# Patient Record
Sex: FEMALE | Race: WHITE | NOT HISPANIC OR LATINO | Employment: FULL TIME | URBAN - METROPOLITAN AREA
[De-identification: names, ages, dates, MRNs, and addresses within clinical notes are randomized per-mention and may not be internally consistent; named-entity substitution may affect disease eponyms.]

---

## 2021-05-11 ENCOUNTER — IMMUNIZATIONS (OUTPATIENT)
Dept: FAMILY MEDICINE CLINIC | Facility: HOSPITAL | Age: 61
End: 2021-05-11

## 2021-05-11 DIAGNOSIS — Z23 ENCOUNTER FOR IMMUNIZATION: Primary | ICD-10-CM

## 2021-05-11 PROCEDURE — 0001A SARS-COV-2 / COVID-19 MRNA VACCINE (PFIZER-BIONTECH) 30 MCG: CPT

## 2021-05-11 PROCEDURE — 91300 SARS-COV-2 / COVID-19 MRNA VACCINE (PFIZER-BIONTECH) 30 MCG: CPT

## 2021-06-03 ENCOUNTER — IMMUNIZATIONS (OUTPATIENT)
Dept: FAMILY MEDICINE CLINIC | Facility: HOSPITAL | Age: 61
End: 2021-06-03

## 2021-06-03 DIAGNOSIS — Z23 ENCOUNTER FOR IMMUNIZATION: Primary | ICD-10-CM

## 2021-06-03 PROCEDURE — 91300 SARS-COV-2 / COVID-19 MRNA VACCINE (PFIZER-BIONTECH) 30 MCG: CPT

## 2021-06-03 PROCEDURE — 0002A SARS-COV-2 / COVID-19 MRNA VACCINE (PFIZER-BIONTECH) 30 MCG: CPT

## 2021-10-05 ENCOUNTER — APPOINTMENT (EMERGENCY)
Dept: RADIOLOGY | Facility: HOSPITAL | Age: 61
End: 2021-10-05
Payer: COMMERCIAL

## 2021-10-05 ENCOUNTER — APPOINTMENT (EMERGENCY)
Dept: ULTRASOUND IMAGING | Facility: HOSPITAL | Age: 61
End: 2021-10-05
Payer: COMMERCIAL

## 2021-10-05 ENCOUNTER — HOSPITAL ENCOUNTER (EMERGENCY)
Facility: HOSPITAL | Age: 61
Discharge: HOME | End: 2021-10-05
Attending: EMERGENCY MEDICINE | Admitting: EMERGENCY MEDICINE
Payer: COMMERCIAL

## 2021-10-05 VITALS
HEIGHT: 64 IN | OXYGEN SATURATION: 99 % | RESPIRATION RATE: 18 BRPM | DIASTOLIC BLOOD PRESSURE: 63 MMHG | SYSTOLIC BLOOD PRESSURE: 141 MMHG | TEMPERATURE: 98 F | WEIGHT: 147 LBS | HEART RATE: 52 BPM | BODY MASS INDEX: 25.1 KG/M2

## 2021-10-05 DIAGNOSIS — R07.9 CHEST PAIN: Primary | ICD-10-CM

## 2021-10-05 DIAGNOSIS — K29.70 GASTRITIS: ICD-10-CM

## 2021-10-05 LAB
ALBUMIN SERPL BCP-MCNC: 3.6 G/DL (ref 3.5–5)
ALP SERPL-CCNC: 127 U/L (ref 46–116)
ALT SERPL W P-5'-P-CCNC: 19 U/L (ref 12–78)
ANION GAP SERPL CALCULATED.3IONS-SCNC: 10 MMOL/L (ref 4–13)
AST SERPL W P-5'-P-CCNC: 19 U/L (ref 5–45)
BASOPHILS # BLD AUTO: 0.04 THOUSANDS/ΜL (ref 0–0.1)
BASOPHILS NFR BLD AUTO: 1 % (ref 0–1)
BILIRUB SERPL-MCNC: 0.37 MG/DL (ref 0.2–1)
BUN SERPL-MCNC: 19 MG/DL (ref 5–25)
CALCIUM SERPL-MCNC: 8.8 MG/DL (ref 8.3–10.1)
CHLORIDE SERPL-SCNC: 104 MMOL/L (ref 100–108)
CO2 SERPL-SCNC: 26 MMOL/L (ref 21–32)
CREAT SERPL-MCNC: 1.01 MG/DL (ref 0.6–1.3)
EOSINOPHIL # BLD AUTO: 0.07 THOUSAND/ΜL (ref 0–0.61)
EOSINOPHIL NFR BLD AUTO: 1 % (ref 0–6)
ERYTHROCYTE [DISTWIDTH] IN BLOOD BY AUTOMATED COUNT: 14.3 % (ref 11.6–15.1)
GFR SERPL CREATININE-BSD FRML MDRD: 61 ML/MIN/1.73SQ M
GLUCOSE SERPL-MCNC: 97 MG/DL (ref 65–140)
HCT VFR BLD AUTO: 40.4 % (ref 34.8–46.1)
HGB BLD-MCNC: 12.8 G/DL (ref 11.5–15.4)
IMM GRANULOCYTES # BLD AUTO: 0.02 THOUSAND/UL (ref 0–0.2)
IMM GRANULOCYTES NFR BLD AUTO: 0 % (ref 0–2)
LYMPHOCYTES # BLD AUTO: 2.46 THOUSANDS/ΜL (ref 0.6–4.47)
LYMPHOCYTES NFR BLD AUTO: 33 % (ref 14–44)
MCH RBC QN AUTO: 31.8 PG (ref 26.8–34.3)
MCHC RBC AUTO-ENTMCNC: 31.7 G/DL (ref 31.4–37.4)
MCV RBC AUTO: 100 FL (ref 82–98)
MONOCYTES # BLD AUTO: 0.47 THOUSAND/ΜL (ref 0.17–1.22)
MONOCYTES NFR BLD AUTO: 6 % (ref 4–12)
NEUTROPHILS # BLD AUTO: 4.32 THOUSANDS/ΜL (ref 1.85–7.62)
NEUTS SEG NFR BLD AUTO: 59 % (ref 43–75)
NRBC BLD AUTO-RTO: 0 /100 WBCS
PLATELET # BLD AUTO: 261 THOUSANDS/UL (ref 149–390)
PMV BLD AUTO: 10.1 FL (ref 8.9–12.7)
POTASSIUM SERPL-SCNC: 4.2 MMOL/L (ref 3.5–5.3)
PROT SERPL-MCNC: 7 G/DL (ref 6.4–8.2)
RBC # BLD AUTO: 4.03 MILLION/UL (ref 3.81–5.12)
SODIUM SERPL-SCNC: 140 MMOL/L (ref 136–145)
TROPONIN I SERPL-MCNC: <0.02 NG/ML
WBC # BLD AUTO: 7.38 THOUSAND/UL (ref 4.31–10.16)

## 2021-10-05 PROCEDURE — 99284 EMERGENCY DEPT VISIT MOD MDM: CPT | Performed by: EMERGENCY MEDICINE

## 2021-10-05 PROCEDURE — 99285 EMERGENCY DEPT VISIT HI MDM: CPT

## 2021-10-05 PROCEDURE — 93005 ELECTROCARDIOGRAM TRACING: CPT

## 2021-10-05 PROCEDURE — 71045 X-RAY EXAM CHEST 1 VIEW: CPT

## 2021-10-05 PROCEDURE — 84484 ASSAY OF TROPONIN QUANT: CPT | Performed by: EMERGENCY MEDICINE

## 2021-10-05 PROCEDURE — 85025 COMPLETE CBC W/AUTO DIFF WBC: CPT | Performed by: EMERGENCY MEDICINE

## 2021-10-05 PROCEDURE — 36415 COLL VENOUS BLD VENIPUNCTURE: CPT

## 2021-10-05 PROCEDURE — 80053 COMPREHEN METABOLIC PANEL: CPT | Performed by: EMERGENCY MEDICINE

## 2021-10-05 PROCEDURE — 76705 ECHO EXAM OF ABDOMEN: CPT

## 2021-10-05 RX ORDER — LIDOCAINE HYDROCHLORIDE 20 MG/ML
15 SOLUTION OROPHARYNGEAL ONCE
Status: COMPLETED | OUTPATIENT
Start: 2021-10-05 | End: 2021-10-05

## 2021-10-05 RX ORDER — OMEPRAZOLE 20 MG/1
20 CAPSULE, DELAYED RELEASE ORAL DAILY
Qty: 30 CAPSULE | Refills: 0 | Status: SHIPPED | OUTPATIENT
Start: 2021-10-05 | End: 2021-11-04

## 2021-10-05 RX ORDER — MAGNESIUM HYDROXIDE/ALUMINUM HYDROXICE/SIMETHICONE 120; 1200; 1200 MG/30ML; MG/30ML; MG/30ML
30 SUSPENSION ORAL ONCE
Status: COMPLETED | OUTPATIENT
Start: 2021-10-05 | End: 2021-10-05

## 2021-10-05 RX ORDER — SUCRALFATE 1 G/1
1 TABLET ORAL 4 TIMES DAILY
Qty: 120 TABLET | Refills: 0 | Status: SHIPPED | OUTPATIENT
Start: 2021-10-05 | End: 2021-11-04

## 2021-10-05 RX ADMIN — ALUMINA, MAGNESIA, AND SIMETHICONE ORAL SUSPENSION REGULAR STRENGTH 30 ML: 1200; 1200; 120 SUSPENSION ORAL at 12:46

## 2021-10-05 RX ADMIN — LIDOCAINE HYDROCHLORIDE 15 ML: 20 SOLUTION ORAL; TOPICAL at 12:46

## 2021-10-08 LAB
ATRIAL RATE: 58 BPM
P AXIS: 38 DEGREES
PR INTERVAL: 158 MS
QRS AXIS: 42 DEGREES
QRSD INTERVAL: 72 MS
QT INTERVAL: 412 MS
QTC INTERVAL: 398 MS
T WAVE AXIS: 35 DEGREES
VENTRICULAR RATE: 56 BPM

## 2021-10-08 PROCEDURE — 93010 ELECTROCARDIOGRAM REPORT: CPT | Performed by: INTERNAL MEDICINE

## 2023-11-17 ENCOUNTER — PREP FOR PROCEDURE (OUTPATIENT)
Dept: OBGYN CLINIC | Facility: OTHER | Age: 63
End: 2023-11-17

## 2023-11-17 DIAGNOSIS — M19.012 PRIMARY OSTEOARTHRITIS OF LEFT SHOULDER: Primary | ICD-10-CM

## 2023-11-30 ENCOUNTER — ANESTHESIA EVENT (OUTPATIENT)
Dept: PERIOP | Facility: HOSPITAL | Age: 63
End: 2023-11-30
Payer: COMMERCIAL

## 2023-11-30 RX ORDER — FOLIC ACID 1 MG/1
TABLET ORAL DAILY
COMMUNITY

## 2023-11-30 RX ORDER — OXYBUTYNIN CHLORIDE 10 MG/1
10 TABLET, EXTENDED RELEASE ORAL AS NEEDED
COMMUNITY
Start: 2023-10-30 | End: 2024-10-29

## 2023-11-30 RX ORDER — CONJUGATED ESTROGENS 0.62 MG/G
CREAM VAGINAL
COMMUNITY
Start: 2023-10-30 | End: 2024-10-29

## 2023-11-30 RX ORDER — CYCLOSPORINE 0.5 MG/ML
1 EMULSION OPHTHALMIC 2 TIMES DAILY
COMMUNITY
Start: 2023-09-22

## 2023-11-30 RX ORDER — IBUPROFEN 200 MG
200 TABLET ORAL EVERY 6 HOURS PRN
COMMUNITY
End: 2023-12-21

## 2023-11-30 RX ORDER — ETANERCEPT 50 MG/ML
50 SOLUTION SUBCUTANEOUS
COMMUNITY
Start: 2023-10-27

## 2023-11-30 NOTE — PRE-PROCEDURE INSTRUCTIONS
Pre-Surgery Instructions:   Medication Instructions    Cholecalciferol (Vitamin D-3) 125 MCG (5000 UT) TABS Stop taking 7 days prior to surgery.    cycloSPORINE (RESTASIS) 0.05 % ophthalmic emulsion Take day of surgery.    Enbrel 50 MG/ML SOSY Instructions provided by MD    folic acid (FOLVITE) 1 mg tablet Hold day of surgery.    ibuprofen (MOTRIN) 200 mg tablet Stop taking 7 days prior to surgery.    methotrexate 2.5 mg tablet Instructions provided by MD    ONPIEROULINUMTOXINA IJ Takes every 3 mos-due 2/2024    oxybutynin (DITROPAN-XL) 10 MG 24 hr tablet Uses PRN- DO NOT take day of surgery    Premarin vaginal cream Hold day of surgery.   Medication instructions for day surgery reviewed. Please use only a sip of water to take your instructed medications. Avoid all over the counter vitamins, supplements and NSAIDS for one week prior to surgery per anesthesia guidelines. Tylenol is ok to take as needed.     You will receive a call one business day prior to surgery with an arrival time and hospital directions. If your surgery is scheduled on a Monday, the hospital will be calling you on the Friday prior to your surgery. If you have not heard from anyone by 8pm, please call the hospital supervisor through the hospital  at [phone]. (Adiel [phone]).    Do not eat or drink anything after midnight the night before your surgery, including candy, mints, lifesavers, or chewing gum. Do not drink alcohol 24hrs before your surgery. Try not to smoke at least 24hrs before your surgery.       Follow the pre surgery showering instructions as listed in the “My Surgical Experience Booklet” or otherwise provided by your surgeon's office. Do not use a blade to shave the surgical area 1 week before surgery. It is okay to use a clean electric clippers up to 24 hours before surgery. Do not apply any lotions, creams, including makeup, cologne, deodorant, or perfumes after showering on the day of your surgery. Do not use  dry shampoo, hair spray, hair gel, or any type of hair products.     No contact lenses, eye make-up, or artificial eyelashes. Remove nail polish, including gel polish, and any artificial, gel, or acrylic nails if possible. Remove all jewelry including rings and body piercing jewelry.     Wear causal clothing that is easy to take on and off. Consider your type of surgery.    Keep any valuables, jewelry, piercings at home. Please bring any specially ordered equipment (sling, braces) if indicated.    Arrange for a responsible person to drive you to and from the hospital on the day of your surgery. Visitor Guidelines discussed.     Call the surgeon's office with any new illnesses, exposures, or additional questions prior to surgery.    Please reference your “My Surgical Experience Booklet” for additional information to prepare for your upcoming surgery.

## 2023-12-01 ENCOUNTER — APPOINTMENT (OUTPATIENT)
Dept: LAB | Facility: CLINIC | Age: 63
End: 2023-12-01
Payer: COMMERCIAL

## 2023-12-08 ENCOUNTER — APPOINTMENT (OUTPATIENT)
Dept: LAB | Facility: CLINIC | Age: 63
End: 2023-12-08
Payer: COMMERCIAL

## 2023-12-08 DIAGNOSIS — M19.012 PRIMARY OSTEOARTHRITIS OF LEFT SHOULDER: ICD-10-CM

## 2023-12-08 LAB
ABO GROUP BLD: NORMAL
BLD GP AB SCN SERPL QL: NEGATIVE
RH BLD: POSITIVE
SPECIMEN EXPIRATION DATE: NORMAL

## 2023-12-08 PROCEDURE — 86900 BLOOD TYPING SEROLOGIC ABO: CPT

## 2023-12-08 PROCEDURE — 86850 RBC ANTIBODY SCREEN: CPT

## 2023-12-08 PROCEDURE — 36415 COLL VENOUS BLD VENIPUNCTURE: CPT

## 2023-12-08 PROCEDURE — 86901 BLOOD TYPING SEROLOGIC RH(D): CPT

## 2023-12-19 RX ORDER — SENNOSIDES 8.6 MG
1 TABLET ORAL DAILY
Status: CANCELLED | OUTPATIENT
Start: 2023-12-20

## 2023-12-19 RX ORDER — CEFAZOLIN SODIUM 1 G/50ML
1000 SOLUTION INTRAVENOUS EVERY 8 HOURS
Status: CANCELLED | OUTPATIENT
Start: 2023-12-19 | End: 2023-12-20

## 2023-12-19 RX ORDER — ASPIRIN 81 MG/1
81 TABLET, CHEWABLE ORAL DAILY
Status: CANCELLED | OUTPATIENT
Start: 2023-12-20 | End: 2024-01-10

## 2023-12-19 RX ORDER — ONDANSETRON 2 MG/ML
4 INJECTION INTRAMUSCULAR; INTRAVENOUS EVERY 6 HOURS PRN
Status: CANCELLED | OUTPATIENT
Start: 2023-12-19

## 2023-12-19 RX ORDER — HYDROMORPHONE HCL/PF 1 MG/ML
0.5 SYRINGE (ML) INJECTION EVERY 2 HOUR PRN
Status: CANCELLED | OUTPATIENT
Start: 2023-12-19 | End: 2023-12-21

## 2023-12-20 ENCOUNTER — APPOINTMENT (OUTPATIENT)
Dept: RADIOLOGY | Facility: HOSPITAL | Age: 63
End: 2023-12-20
Payer: COMMERCIAL

## 2023-12-20 ENCOUNTER — ANESTHESIA (OUTPATIENT)
Dept: PERIOP | Facility: HOSPITAL | Age: 63
End: 2023-12-20
Payer: COMMERCIAL

## 2023-12-20 ENCOUNTER — HOSPITAL ENCOUNTER (OUTPATIENT)
Facility: HOSPITAL | Age: 63
Setting detail: OUTPATIENT SURGERY
Discharge: HOME | End: 2023-12-21
Attending: ORTHOPAEDIC SURGERY | Admitting: ORTHOPAEDIC SURGERY
Payer: COMMERCIAL

## 2023-12-20 DIAGNOSIS — M12.812 ROTATOR CUFF TEAR ARTHROPATHY OF LEFT SHOULDER: Primary | ICD-10-CM

## 2023-12-20 DIAGNOSIS — M75.102 ROTATOR CUFF TEAR ARTHROPATHY OF LEFT SHOULDER: Primary | ICD-10-CM

## 2023-12-20 PROBLEM — R33.9 URINARY RETENTION: Status: ACTIVE | Noted: 2021-01-16

## 2023-12-20 PROBLEM — M19.012 PRIMARY OSTEOARTHRITIS OF LEFT SHOULDER: Status: ACTIVE | Noted: 2023-12-20

## 2023-12-20 LAB
ABO GROUP BLD: NORMAL
RH BLD: POSITIVE

## 2023-12-20 PROCEDURE — C1776 JOINT DEVICE (IMPLANTABLE): HCPCS | Performed by: ORTHOPAEDIC SURGERY

## 2023-12-20 PROCEDURE — 99244 OFF/OP CNSLTJ NEW/EST MOD 40: CPT | Performed by: INTERNAL MEDICINE

## 2023-12-20 PROCEDURE — 73020 X-RAY EXAM OF SHOULDER: CPT

## 2023-12-20 PROCEDURE — C1713 ANCHOR/SCREW BN/BN,TIS/BN: HCPCS | Performed by: ORTHOPAEDIC SURGERY

## 2023-12-20 PROCEDURE — C9290 INJ, BUPIVACAINE LIPOSOME: HCPCS | Performed by: ANESTHESIOLOGY

## 2023-12-20 DEVICE — 5.5X32MM PERIPHERAL SCREW, LOCKING
Type: IMPLANTABLE DEVICE | Site: SHOULDER | Status: FUNCTIONAL
Brand: ARTHREX®

## 2023-12-20 DEVICE — 5.5X36MM PERIPHERAL SCREW, LOCKING
Type: IMPLANTABLE DEVICE | Site: SHOULDER | Status: FUNCTIONAL
Brand: ARTHREX®

## 2023-12-20 DEVICE — UNIVERS REVERS SUTURE CUP, 33 (+2 LEFT)
Type: IMPLANTABLE DEVICE | Site: SHOULDER | Status: FUNCTIONAL
Brand: ARTHREX®

## 2023-12-20 DEVICE — HUMERAL INSERT XS 33+3MM
Type: IMPLANTABLE DEVICE | Site: SHOULDER | Status: FUNCTIONAL
Brand: ARTHREX®

## 2023-12-20 DEVICE — 24MM BASEPLATE, MONOBLOCK POST
Type: IMPLANTABLE DEVICE | Site: SHOULDER | Status: FUNCTIONAL
Brand: ARTHREX®

## 2023-12-20 DEVICE — 33 +4 LAT/24 GLENOSPHERE
Type: IMPLANTABLE DEVICE | Site: SHOULDER | Status: FUNCTIONAL
Brand: ARTHREX®

## 2023-12-20 DEVICE — UNIVERS REVERS HUMERAL STEM, SIZE 6
Type: IMPLANTABLE DEVICE | Site: SHOULDER | Status: FUNCTIONAL
Brand: ARTHREX®

## 2023-12-20 DEVICE — 5.5X16MM PERIPHERAL SCREW, LOCKING
Type: IMPLANTABLE DEVICE | Site: SHOULDER | Status: FUNCTIONAL
Brand: ARTHREX®

## 2023-12-20 RX ORDER — MAGNESIUM HYDROXIDE 1200 MG/15ML
LIQUID ORAL AS NEEDED
Status: DISCONTINUED | OUTPATIENT
Start: 2023-12-20 | End: 2023-12-20 | Stop reason: HOSPADM

## 2023-12-20 RX ORDER — FOLIC ACID 1 MG/1
1 TABLET ORAL DAILY
Status: DISCONTINUED | OUTPATIENT
Start: 2023-12-21 | End: 2023-12-21 | Stop reason: HOSPADM

## 2023-12-20 RX ORDER — MIDAZOLAM HYDROCHLORIDE 2 MG/2ML
INJECTION, SOLUTION INTRAMUSCULAR; INTRAVENOUS
Status: COMPLETED | OUTPATIENT
Start: 2023-12-20 | End: 2023-12-20

## 2023-12-20 RX ORDER — BUPIVACAINE HYDROCHLORIDE 5 MG/ML
INJECTION, SOLUTION EPIDURAL; INTRACAUDAL
Status: COMPLETED | OUTPATIENT
Start: 2023-12-20 | End: 2023-12-20

## 2023-12-20 RX ORDER — HYDROMORPHONE HCL/PF 1 MG/ML
0.5 SYRINGE (ML) INJECTION
Status: DISCONTINUED | OUTPATIENT
Start: 2023-12-20 | End: 2023-12-20 | Stop reason: HOSPADM

## 2023-12-20 RX ORDER — ACETAMINOPHEN 325 MG/1
650 TABLET ORAL EVERY 6 HOURS PRN
Status: DISCONTINUED | OUTPATIENT
Start: 2023-12-20 | End: 2023-12-21 | Stop reason: HOSPADM

## 2023-12-20 RX ORDER — DEXAMETHASONE SODIUM PHOSPHATE 10 MG/ML
INJECTION, SOLUTION INTRAMUSCULAR; INTRAVENOUS AS NEEDED
Status: DISCONTINUED | OUTPATIENT
Start: 2023-12-20 | End: 2023-12-20

## 2023-12-20 RX ORDER — LIDOCAINE HYDROCHLORIDE 20 MG/ML
INJECTION, SOLUTION EPIDURAL; INFILTRATION; INTRACAUDAL; PERINEURAL AS NEEDED
Status: DISCONTINUED | OUTPATIENT
Start: 2023-12-20 | End: 2023-12-20

## 2023-12-20 RX ORDER — ONDANSETRON 2 MG/ML
INJECTION INTRAMUSCULAR; INTRAVENOUS AS NEEDED
Status: DISCONTINUED | OUTPATIENT
Start: 2023-12-20 | End: 2023-12-20

## 2023-12-20 RX ORDER — OXYCODONE HYDROCHLORIDE 5 MG/1
5 TABLET ORAL EVERY 4 HOURS PRN
Status: DISCONTINUED | OUTPATIENT
Start: 2023-12-20 | End: 2023-12-21 | Stop reason: HOSPADM

## 2023-12-20 RX ORDER — FENTANYL CITRATE 50 UG/ML
INJECTION, SOLUTION INTRAMUSCULAR; INTRAVENOUS
Status: COMPLETED | OUTPATIENT
Start: 2023-12-20 | End: 2023-12-20

## 2023-12-20 RX ORDER — LABETALOL HYDROCHLORIDE 5 MG/ML
INJECTION, SOLUTION INTRAVENOUS AS NEEDED
Status: DISCONTINUED | OUTPATIENT
Start: 2023-12-20 | End: 2023-12-20

## 2023-12-20 RX ORDER — FENTANYL CITRATE/PF 50 MCG/ML
25 SYRINGE (ML) INJECTION
Status: DISCONTINUED | OUTPATIENT
Start: 2023-12-20 | End: 2023-12-20 | Stop reason: HOSPADM

## 2023-12-20 RX ORDER — CEFAZOLIN SODIUM 2 G/50ML
2000 SOLUTION INTRAVENOUS ONCE
Status: COMPLETED | OUTPATIENT
Start: 2023-12-20 | End: 2023-12-20

## 2023-12-20 RX ORDER — SODIUM CHLORIDE, SODIUM LACTATE, POTASSIUM CHLORIDE, CALCIUM CHLORIDE 600; 310; 30; 20 MG/100ML; MG/100ML; MG/100ML; MG/100ML
125 INJECTION, SOLUTION INTRAVENOUS CONTINUOUS
Status: DISCONTINUED | OUTPATIENT
Start: 2023-12-20 | End: 2023-12-21 | Stop reason: HOSPADM

## 2023-12-20 RX ORDER — ONDANSETRON 2 MG/ML
4 INJECTION INTRAMUSCULAR; INTRAVENOUS ONCE AS NEEDED
Status: DISCONTINUED | OUTPATIENT
Start: 2023-12-20 | End: 2023-12-20 | Stop reason: HOSPADM

## 2023-12-20 RX ORDER — ROCURONIUM BROMIDE 10 MG/ML
INJECTION, SOLUTION INTRAVENOUS AS NEEDED
Status: DISCONTINUED | OUTPATIENT
Start: 2023-12-20 | End: 2023-12-20

## 2023-12-20 RX ORDER — MEPERIDINE HYDROCHLORIDE 25 MG/ML
12.5 INJECTION INTRAMUSCULAR; INTRAVENOUS; SUBCUTANEOUS
Status: DISCONTINUED | OUTPATIENT
Start: 2023-12-20 | End: 2023-12-20 | Stop reason: HOSPADM

## 2023-12-20 RX ORDER — PROPOFOL 10 MG/ML
INJECTION, EMULSION INTRAVENOUS AS NEEDED
Status: DISCONTINUED | OUTPATIENT
Start: 2023-12-20 | End: 2023-12-20

## 2023-12-20 RX ADMIN — OXYCODONE HYDROCHLORIDE 5 MG: 5 TABLET ORAL at 21:09

## 2023-12-20 RX ADMIN — CEFAZOLIN SODIUM 2000 MG: 2 SOLUTION INTRAVENOUS at 09:28

## 2023-12-20 RX ADMIN — HYDROMORPHONE HYDROCHLORIDE 0.5 MG: 1 INJECTION, SOLUTION INTRAMUSCULAR; INTRAVENOUS; SUBCUTANEOUS at 12:29

## 2023-12-20 RX ADMIN — HYDROMORPHONE HYDROCHLORIDE 0.5 MG: 1 INJECTION, SOLUTION INTRAMUSCULAR; INTRAVENOUS; SUBCUTANEOUS at 12:17

## 2023-12-20 RX ADMIN — BUPIVACAINE 10 ML: 13.3 INJECTION, SUSPENSION, LIPOSOMAL INFILTRATION at 09:23

## 2023-12-20 RX ADMIN — BUPIVACAINE HYDROCHLORIDE 10 ML: 5 INJECTION, SOLUTION EPIDURAL; INTRACAUDAL; PERINEURAL at 09:23

## 2023-12-20 RX ADMIN — FENTANYL CITRATE 100 MCG: 50 INJECTION, SOLUTION INTRAMUSCULAR; INTRAVENOUS at 09:23

## 2023-12-20 RX ADMIN — SODIUM CHLORIDE, SODIUM LACTATE, POTASSIUM CHLORIDE, AND CALCIUM CHLORIDE: .6; .31; .03; .02 INJECTION, SOLUTION INTRAVENOUS at 10:26

## 2023-12-20 RX ADMIN — OXYCODONE HYDROCHLORIDE 5 MG: 5 TABLET ORAL at 16:44

## 2023-12-20 RX ADMIN — SUGAMMADEX 200 MG: 100 INJECTION, SOLUTION INTRAVENOUS at 11:15

## 2023-12-20 RX ADMIN — FENTANYL CITRATE 25 MCG: 50 INJECTION, SOLUTION INTRAMUSCULAR; INTRAVENOUS at 11:46

## 2023-12-20 RX ADMIN — ROCURONIUM BROMIDE 10 MG: 10 INJECTION, SOLUTION INTRAVENOUS at 10:04

## 2023-12-20 RX ADMIN — MIDAZOLAM 2 MG: 1 INJECTION INTRAMUSCULAR; INTRAVENOUS at 09:23

## 2023-12-20 RX ADMIN — LIDOCAINE HYDROCHLORIDE 60 MG: 20 INJECTION, SOLUTION EPIDURAL; INFILTRATION; INTRACAUDAL at 09:34

## 2023-12-20 RX ADMIN — SODIUM CHLORIDE, SODIUM LACTATE, POTASSIUM CHLORIDE, AND CALCIUM CHLORIDE 125 ML/HR: .6; .31; .03; .02 INJECTION, SOLUTION INTRAVENOUS at 08:44

## 2023-12-20 RX ADMIN — PROPOFOL 150 MG: 10 INJECTION, EMULSION INTRAVENOUS at 09:34

## 2023-12-20 RX ADMIN — ONDANSETRON 4 MG: 2 INJECTION INTRAMUSCULAR; INTRAVENOUS at 10:53

## 2023-12-20 RX ADMIN — LABETALOL HYDROCHLORIDE 5 MG: 5 INJECTION, SOLUTION INTRAVENOUS at 11:13

## 2023-12-20 RX ADMIN — ROCURONIUM BROMIDE 30 MG: 10 INJECTION, SOLUTION INTRAVENOUS at 09:34

## 2023-12-20 RX ADMIN — FENTANYL CITRATE 25 MCG: 50 INJECTION, SOLUTION INTRAMUSCULAR; INTRAVENOUS at 12:10

## 2023-12-20 RX ADMIN — SODIUM CHLORIDE, SODIUM LACTATE, POTASSIUM CHLORIDE, AND CALCIUM CHLORIDE 125 ML/HR: .6; .31; .03; .02 INJECTION, SOLUTION INTRAVENOUS at 14:45

## 2023-12-20 RX ADMIN — DEXAMETHASONE SODIUM PHOSPHATE 7 MG: 10 INJECTION INTRAMUSCULAR; INTRAVENOUS at 10:08

## 2023-12-20 NOTE — ANESTHESIA PROCEDURE NOTES
Peripheral Block    Patient location during procedure: holding area  Start time: 12/20/2023 9:10 AM  Reason for block: at surgeon's request and post-op pain management  Staffing  Performed by: Jake Borja DO  Authorized by: Jake Borja DO    Preanesthetic Checklist  Completed: patient identified, IV checked, site marked, risks and benefits discussed, surgical consent, monitors and equipment checked, pre-op evaluation and timeout performed  Peripheral Block  Patient position: supine  Prep: ChloraPrep  Patient monitoring: continuous pulse oximetry, frequent blood pressure checks and heart rate  Block type: Interscalene  Laterality: left  Injection technique: single-shot  Procedures: ultrasound guided, Ultrasound guidance required for the procedure to increase accuracy and safety of medication placement and decrease risk of complications.  Ultrasound permanent image savedbupivacaine (PF) (MARCAINE) 0.5 % injection 20 mL - Perineural   10 mL - 12/20/2023 9:23:00 AM  bupivacaine liposomal (EXPAREL) 1.3 % injection 20 mL - Perineural   10 mL - 12/20/2023 9:23:00 AM  midazolam (VERSED) injection 0.5 mg - Intravenous   2 mg - 12/20/2023 9:23:00 AM  fentanyl citrate (PF) 100 MCG/2ML 50 mcg - Intravenous   100 mcg - 12/20/2023 9:23:00 AM  Needle  Needle type: Stimuplex   Needle gauge: 22 G  Needle length: 2 in  Needle localization: ultrasound guidance and nerve stimulator  Assessment  Injection assessment: frequent aspiration, injected with ease, negative aspiration, no paresthesia on injection, incremental injection, needle tip visualized at all times, negative for heart rate change and no symptoms of intraneural/intravenous injection  Paresthesia pain: none  Post-procedure:  site cleaned  patient tolerated the procedure well with no immediate complications

## 2023-12-20 NOTE — INTERVAL H&P NOTE
H&P reviewed. After examining the patient I find no changes in the patients condition since the H&P had been written.    Vitals:    12/20/23 0831   BP: 135/67   Pulse: 72   Resp: 16   Temp: 97.5 °F (36.4 °C)   SpO2: 100%

## 2023-12-20 NOTE — CONSULTS
[facility]  Consult  Name: Mary Lopez 63 y.o. female I MRN: [id number]  Unit/Bed#: E2 -01 I Date of Admission: 12/20/2023   Date of Service: 12/20/2023 I Hospital Day: 0    Inpatient consult to Internal Medicine  Consult performed by: Carl Diaz DO  Consult ordered by: Sheron Driscoll PA-C          Assessment/Plan   * Primary osteoarthritis of left shoulder  Assessment & Plan  Degenerative joint disease  Status post reverse total shoulder arthroplasty  DVT prophylaxis, pain control, dispo per primary team    Urinary retention  Assessment & Plan  Utilizes oxybutynin as needed  Urinary retention protocol    Rheumatoid arthritis involving multiple sites with positive rheumatoid factor (HCC)  Assessment & Plan  Utilizes Embrel weekly.  This has been on hold since the 12th and for 2 weeks after procedure.  Takes methotrexate 15 mg total once a week on Saturdays  Utilizes NSAIDs as needed  Follows with rheumatology           VTE Prophylaxis:  Per primary team    Recommendations for Discharge:  Will follow along    Counseling / Coordination of Care Time: I have spent a total time of 55 minutes on 12/20/23 in caring for this patient including Risks and benefits of tx options, Instructions for management, Patient and family education, Impressions, Documenting in the medical record, Reviewing / ordering tests, medicine, procedures  , Obtaining or reviewing history  , and Communicating with other healthcare professionals .      History of Present Illness:    Mary Lopez is a 63 y.o. female With past medical history of of rheumatoid arthritis managed with methotrexate and Enbrel, neurogenic bladder who presents to the hospital for elective left reverse total shoulder arthroplasty.  Patient seen and evaluated at bedside.  She has rheumatoid arthritis which typically involves her hands she uses etanercept which has been on hold perioperatively and utilizes methotrexate which she takes  on Saturdays.  She uses oxybutynin as needed for long trips but not daily.  She uses d-mannose as a preventative for urinary tract infections.  Following the procedure she has a little bit of left-sided headache and some shoulder pain but otherwise is feeling okay.    Review of Systems:    Review of Systems   Constitutional:  Negative for chills and fever.   HENT:  Negative for sore throat and trouble swallowing.    Eyes:  Negative for photophobia and visual disturbance.   Respiratory:  Negative for shortness of breath and wheezing.    Cardiovascular:  Negative for chest pain and palpitations.   Gastrointestinal:  Negative for constipation, diarrhea, nausea and vomiting.   Genitourinary:  Negative for difficulty urinating and dysuria.   Musculoskeletal:  Positive for arthralgias. Negative for myalgias.   Skin:  Negative for rash and wound.   Neurological:  Positive for headaches. Negative for dizziness and light-headedness.       Past Medical and Surgical History:     Past Medical History:   Diagnosis Date    Anemia     Anxiety     Arthritis     Coronary artery disease     Muscle weakness     left side    Myocarditis associated with COVID-19 vaccination      now resolved    Neurogenic bladder     Rheumatoid arthritis (HCC)     Septic arthritis of shoulder, left (HCC)     MSSA-cervical spine, lumbar spine    UTI (urinary tract infection)        Past Surgical History:   Procedure Laterality Date    CERVICAL FUSION      FOOT SURGERY Bilateral     HERNIA REPAIR      as child       Meds/Allergies:    Pertinent medications reviewed    Allergies:   Allergies   Allergen Reactions    Tramadol Other (See Comments)     Hard to wake up    Covid-19 (Subunit) Vaccine Other (See Comments)     myocarditis    Ethylenediamine Itching     Other reaction(s): ETHYLENEDIAMINE (Hives and edema)    Benadryl [Diphenhydramine] Palpitations    Other Rash     Topical cream        Social History:     Marital Status: /Civil  "Union    Substance Use History:   Social History     Substance and Sexual Activity   Alcohol Use Yes    Comment: socially      Social History     Tobacco Use   Smoking Status Never   Smokeless Tobacco Never     Social History     Substance and Sexual Activity   Drug Use Never       Family History:    Pertinent family history reviewed    Physical Exam:     Vitals:   Blood Pressure: 105/73 (12/20/23 1736)  Pulse: 90 (12/20/23 1736)  Temperature: 98 °F (36.7 °C) (12/20/23 1736)  Temp Source: Temporal (12/20/23 1736)  Respirations: 18 (12/20/23 1736)  Height: 5' 3\" (160 cm) (11/30/23 0827)  Weight - Scale: 70.3 kg (155 lb) (11/30/23 0827)  SpO2: 94 % (12/20/23 1736)    Physical Exam  Vitals reviewed.   Constitutional:       General: She is not in acute distress.     Appearance: She is well-developed. She is not ill-appearing, toxic-appearing or diaphoretic.   HENT:      Head: Normocephalic and atraumatic.      Mouth/Throat:      Mouth: Mucous membranes are moist.   Eyes:      General: No scleral icterus.     Extraocular Movements: Extraocular movements intact.   Cardiovascular:      Rate and Rhythm: Normal rate and regular rhythm.      Heart sounds: Normal heart sounds.   Pulmonary:      Effort: Pulmonary effort is normal. No respiratory distress.      Breath sounds: Normal breath sounds. No wheezing or rales.   Abdominal:      General: There is no distension.      Palpations: Abdomen is soft.      Tenderness: There is no abdominal tenderness. There is no guarding or rebound.   Musculoskeletal:         General: No swelling, tenderness or deformity.      Comments: Left upper extremity in sling   Skin:     General: Skin is warm and dry.   Neurological:      General: No focal deficit present.      Mental Status: She is alert. Mental status is at baseline.   Psychiatric:         Mood and Affect: Mood normal.         Behavior: Behavior normal.         Thought Content: Thought content normal.         Judgment: Judgment " normal.           Additional Data:     Lab Results: I have reviewed pertinent results                     Lab Results   Component Value Date/Time    HGBA1C 5.4 09/16/2021 04:15 AM    HGBA1C 6.1 (H) 01/17/2021 08:10 AM               Imaging: I have reviewed pertinent images     XR shoulder 1 vw left    (Results Pending)       ** Please Note: This note has been constructed using a voice recognition system. **

## 2023-12-20 NOTE — ASSESSMENT & PLAN NOTE
Utilizes Embrel weekly.  This has been on hold since the 12th and for 2 weeks after procedure.  Takes methotrexate 15 mg total once a week on Saturdays  Utilizes NSAIDs as needed  Follows with rheumatology

## 2023-12-20 NOTE — ANESTHESIA POSTPROCEDURE EVALUATION
"Post-Op Assessment Note    CV Status:  Stable  Pain Score: 1    Pain management: adequate       Mental Status:  Alert and awake   Hydration Status:  Euvolemic   PONV Controlled:  Controlled   Airway Patency:  Patent     Post Op Vitals Reviewed: Yes      Staff: Anesthesiologist               BP      Temp      Pulse     Resp      SpO2      /58   Pulse 68   Temp 98 °F (36.7 °C) (Temporal)   Resp 12   Ht 5' 3\" (1.6 m)   Wt 70.3 kg (155 lb)   SpO2 97%   BMI 27.46 kg/m²     "

## 2023-12-20 NOTE — OP NOTE
Left Reverse Total Shoulder Replacement    Patient Name: Mary Lopez  MRN: [id number]  Date of Surgery 12/20/2023    Surgeon: Damon Frances MD  Assistant: Sheron Driscoll PAC      Anesthesia: Scalene block plus general anesthesia.    Preoperative diagnosis: Left shoulder degenerative joint disease.  Rotator cuff tear arthropathy.    Postoperative diagnosis: Left shoulder degenerative joint disease.  Rotator cuff tear arthropathy.    Operative procedure: Left reverse total shoulder arthroplasty    Implants:   Implant Name Type Inv. Item Serial No.  Lot No. LRB No. Used Action   BASEPLATE 24MM MONOBLOCK POST - [id number]  BASEPLATE 24MM MONOBLOCK POST  [employer] [id number] Left 1 Implanted   SCREW PERIPHERAL 5.5 X 36MM LCK - [id number]  SCREW PERIPHERAL 5.5 X 36MM LCK  [employer] [id number] Left 1 Implanted   SCREW PERIPHERAL 5.5 X 32MM LCK - [id number]  SCREW PERIPHERAL 5.5 X 32MM LCK  [employer] [id number] Left 1 Implanted   SCREW PERIPHERAL 5.5 X 16MM LCK - [id number]  SCREW PERIPHERAL 5.5 X 16MM LCK  [employer] [id number] Left 1 Implanted   GLENOSPHERE 33 +4 LAT/24 - [id number]  GLENOSPHERE 33 +4 LAT/24  [employer] [id number] Left 1 Implanted   STEM HUM SZ 6 UNIVERS REVERS - [id number]  STEM HUM SZ 6 UNIVERS REVERS  [employer] [id number] Left 1 Implanted   CUP SUTURE 33 +2 LT UNIVERS REVERS - [id number]  CUP SUTURE 33 +2 LT UNIVERS REVERS  [employer] [id number] Left 1 Implanted   INSERT HUM XS 33 +3 MM - [id number]  INSERT HUM XS 33 +3 MM  [employer] [id number] Left 1 Implanted       Drains: None    Estimated blood loss: 100 cc.    Antibiotics: Given preoperatively    Clinical note: Mary Lopez is a 63 y.o. female who presents with severe left shoulder pain and disability.  Physical exam investigations was consistent with degenerative joint disease.  The patient had been treated nonoperatively and failed to improve.  Nonoperative versus operative options reviewed.  The patient did understand the situation and  did wish to proceed with operative management    Description of procedure:    The patient was identified as Mary Lopez and the left shoulder as the surgical site.  Anesthesia was administered.  The patient was placed in modified beachchair position with the left shoulder and upper extremity prepped and draped in usual fashion for shoulder surgery.    Utilizing a deltopectoral approach, sharp dissection was carried down through skin.  Hemostasis was obtained using electrocautery.  The deltoid and cephalic vein were then identified and retracted laterally with the pectoralis muscle retracted medially.  The conjoined tendon was identified and retracted medially.  The bicipital groove was then identified and incised.  The biceps tendon was elevated from the wound and was found to have degenerative changes with partial tearing and inflammation.  The biceps tendon was then divided and tenodesed to the pectoralis major utilizing #2 FiberWire.  Appropriate retractors were placed and The subscapularis tendon was lifted off the humerus and tagged.  The rotator cuff was very thin / atrophic.  End-stage arthritis of the humeral head was noted.  The proximal cutting guide was then utilized to create a 30° retroverted cut.  Retractors were then placed about the glenoid.  The glenoid was found to be in satisfactory condition for placement of the glenosphere.  Utilizing the cannulated system, the glenoid was prepared with appropriate soft tissue releases.  The baseplate was impacted and 2 locking 2 nonlocking screws were utilized to fix it in place.  A glenosphere was then selected and impacted and secured with the locking screw.  The proximal humerus was machined up to accept the trial stem selecting the 135° angle.  A trial reduction was carried out during appropriate soft tissue tension range of motion and stability.  The trial humeral stem was then removed and after placement of 2 #5 Ethibond sutures through drill holes and  the final stem and cup was impacted into the proximal humerus. The shoulder was reduced for the last time and found to be satisfactory for range of motion and stability.  The wound was copiously irrigated with pulse lavage followed by repair of the subscapularis utilizing the previously placed #5 Ethibond sutures.  The deltoid and pectoralis was allowed to fall closed.  Subcutaneous closure was then carried out utilizing interrupted 2-0 Vicryl sutures followed by 3-0 Monocryl as a subcuticular stitch and Steri-Strips.  A soft absorbent bandage and shoulder immobilizer was then applied. The patient was then transferred to a stretcher in the supine position.  There were no complications.    Throughout the procedure, assistance by Sheron Driscoll PA-C was required.  She was required to aid in positioning the patient preoperatively and intraoperatively she was required to manipulate the patient's left upper extremity as well as various retractors and other equipment under my guidance.  On completion of procedure, she was required to aid in closure of the wound and application of bandage.  She was also required to aid in transfer the patient to recovery.    X-ray: An AP view of the left shoulder was obtained in recovery.  This demonstrated appropriate positioning of the total shoulder arthroplasty components.  There was no evidence loosening or failure.  There was air in the soft tissues consistent with immediate postoperative status the radiographs.       Damon Frances MD    Date: 12/20/2023  Time: 11:13 AM

## 2023-12-20 NOTE — ASSESSMENT & PLAN NOTE
Degenerative joint disease  Status post reverse total shoulder arthroplasty  DVT prophylaxis, pain control, dispo per primary team

## 2023-12-20 NOTE — ANESTHESIA PREPROCEDURE EVALUATION
Procedure:  REVERSE TOTAL SHOULDER REPLACEMENT (Left: Shoulder)    Relevant Problems   MUSCULOSKELETAL   (+) Primary osteoarthritis of left shoulder        Physical Exam    Airway    Mallampati score: II         Dental       Cardiovascular  Rhythm: regular    Pulmonary   Breath sounds clear to auscultation    Other Findings  post-pubertal.      Anesthesia Plan  ASA Score- 2     Anesthesia Type- general with ASA Monitors.         Additional Monitors:     Airway Plan:            Plan Factors-Exercise tolerance (METS): >4 METS.    Chart reviewed.   Existing labs reviewed. Patient summary reviewed.    Patient is not a current smoker. Patient not instructed to abstain from smoking on day of procedure. Patient did not smoke on day of surgery.    Obstructive sleep apnea risk education given perioperatively.        Induction- intravenous.    Postoperative Plan-     Informed Consent- Anesthetic plan and risks discussed with patient.

## 2023-12-21 VITALS
HEIGHT: 63 IN | SYSTOLIC BLOOD PRESSURE: 112 MMHG | HEART RATE: 86 BPM | RESPIRATION RATE: 14 BRPM | BODY MASS INDEX: 27.46 KG/M2 | DIASTOLIC BLOOD PRESSURE: 65 MMHG | TEMPERATURE: 98.1 F | OXYGEN SATURATION: 97 % | WEIGHT: 155 LBS

## 2023-12-21 PROBLEM — D62 ACUTE BLOOD LOSS ANEMIA: Status: ACTIVE | Noted: 2023-12-21

## 2023-12-21 PROBLEM — I95.9 HYPOTENSION: Status: ACTIVE | Noted: 2023-12-21

## 2023-12-21 LAB
ANION GAP SERPL CALCULATED.3IONS-SCNC: 9 MMOL/L
BUN SERPL-MCNC: 11 MG/DL (ref 5–25)
CALCIUM SERPL-MCNC: 7.8 MG/DL (ref 8.4–10.2)
CHLORIDE SERPL-SCNC: 104 MMOL/L (ref 96–108)
CO2 SERPL-SCNC: 22 MMOL/L (ref 21–32)
CREAT SERPL-MCNC: 0.65 MG/DL (ref 0.6–1.3)
ERYTHROCYTE [DISTWIDTH] IN BLOOD BY AUTOMATED COUNT: 13.9 % (ref 11.6–15.1)
GFR SERPL CREATININE-BSD FRML MDRD: 96 ML/MIN/1.73SQ M
GLUCOSE SERPL-MCNC: 122 MG/DL (ref 65–140)
HCT VFR BLD AUTO: 27 % (ref 34.8–46.1)
HGB BLD-MCNC: 8.8 G/DL (ref 11.5–15.4)
MCH RBC QN AUTO: 32.4 PG (ref 26.8–34.3)
MCHC RBC AUTO-ENTMCNC: 32.6 G/DL (ref 31.4–37.4)
MCV RBC AUTO: 99 FL (ref 82–98)
PLATELET # BLD AUTO: 159 THOUSANDS/UL (ref 149–390)
PMV BLD AUTO: 9.6 FL (ref 8.9–12.7)
POTASSIUM SERPL-SCNC: 4 MMOL/L (ref 3.5–5.3)
RBC # BLD AUTO: 2.72 MILLION/UL (ref 3.81–5.12)
SODIUM SERPL-SCNC: 135 MMOL/L (ref 135–147)
WBC # BLD AUTO: 10.31 THOUSAND/UL (ref 4.31–10.16)

## 2023-12-21 PROCEDURE — 85027 COMPLETE CBC AUTOMATED: CPT | Performed by: PHYSICIAN ASSISTANT

## 2023-12-21 PROCEDURE — 97535 SELF CARE MNGMENT TRAINING: CPT

## 2023-12-21 PROCEDURE — 80048 BASIC METABOLIC PNL TOTAL CA: CPT | Performed by: INTERNAL MEDICINE

## 2023-12-21 PROCEDURE — 97167 OT EVAL HIGH COMPLEX 60 MIN: CPT

## 2023-12-21 PROCEDURE — 99232 SBSQ HOSP IP/OBS MODERATE 35: CPT | Performed by: INTERNAL MEDICINE

## 2023-12-21 RX ORDER — OXYCODONE HYDROCHLORIDE 5 MG/1
2.5 TABLET ORAL EVERY 4 HOURS PRN
Start: 2023-12-21 | End: 2023-12-31

## 2023-12-21 RX ORDER — ACETAMINOPHEN 325 MG/1
650 TABLET ORAL EVERY 6 HOURS PRN
Start: 2023-12-21

## 2023-12-21 RX ADMIN — FOLIC ACID 1 MG: 1 TABLET ORAL at 08:01

## 2023-12-21 RX ADMIN — ACETAMINOPHEN 325MG 650 MG: 325 TABLET ORAL at 06:17

## 2023-12-21 RX ADMIN — OXYCODONE HYDROCHLORIDE 5 MG: 5 TABLET ORAL at 02:45

## 2023-12-21 RX ADMIN — OXYCODONE HYDROCHLORIDE 5 MG: 5 TABLET ORAL at 13:09

## 2023-12-21 RX ADMIN — SODIUM CHLORIDE, SODIUM LACTATE, POTASSIUM CHLORIDE, AND CALCIUM CHLORIDE 125 ML/HR: .6; .31; .03; .02 INJECTION, SOLUTION INTRAVENOUS at 07:25

## 2023-12-21 RX ADMIN — OXYCODONE HYDROCHLORIDE 5 MG: 5 TABLET ORAL at 06:42

## 2023-12-21 NOTE — ASSESSMENT & PLAN NOTE
Degenerative joint disease  Status post reverse total shoulder arthroplasty  Complicated by acute blood loss anemia-baseline 13 down to 8.8.  No signs of active bleeding on exam.  DVT prophylaxis, pain control, dispo per primary team    If patient is able to ambulate safely with minimal symptoms of dizziness/lightheadedness/presyncope, okay for discharge from internal medicine perspective

## 2023-12-21 NOTE — PLAN OF CARE
Problem: PAIN - ADULT  Goal: Verbalizes/displays adequate comfort level or baseline comfort level  Description: Interventions:  - Encourage patient to monitor pain and request assistance  - Assess pain using appropriate pain scale  - Administer analgesics based on type and severity of pain and evaluate response  - Implement non-pharmacological measures as appropriate and evaluate response  - Consider cultural and social influences on pain and pain management  - Notify physician/advanced practitioner if interventions unsuccessful or patient reports new pain  Outcome: Progressing     Problem: INFECTION - ADULT  Goal: Absence or prevention of progression during hospitalization  Description: INTERVENTIONS:  - Assess and monitor for signs and symptoms of infection  - Monitor lab/diagnostic results  - Monitor all insertion sites, i.e. indwelling lines, tubes, and drains  - Monitor endotracheal if appropriate and nasal secretions for changes in amount and color  - [place] appropriate cooling/warming therapies per order  - Administer medications as ordered  - Instruct and encourage patient and family to use good hand hygiene technique  - Identify and instruct in appropriate isolation precautions for identified infection/condition  Outcome: Progressing     Problem: SAFETY ADULT  Goal: Patient will remain free of falls  Description: INTERVENTIONS:  - Educate patient/family on patient safety including physical limitations  - Instruct patient to call for assistance with activity   - Consult OT/PT to assist with strengthening/mobility   - Keep Call bell within reach  - Keep bed low and locked with side rails adjusted as appropriate  - Keep care items and personal belongings within reach  - Initiate and maintain comfort rounds  - Make Fall Risk Sign visible to staff  - Offer Toileting every 2 Hours, in advance of need  - Initiate/Maintain bed alarm  - Obtain necessary fall risk management equipment:   - Apply yellow socks and  bracelet for high fall risk patients  - Consider moving patient to room near nurses station  Outcome: Progressing  Goal: Maintain or return to baseline ADL function  Description: INTERVENTIONS:  -  Assess patient's ability to carry out ADLs; assess patient's baseline for ADL function and identify physical deficits which impact ability to perform ADLs (bathing, care of mouth/teeth, toileting, grooming, dressing, etc.)  - Assess/evaluate cause of self-care deficits   - Assess range of motion  - Assess patient's mobility; develop plan if impaired  - Assess patient's need for assistive devices and provide as appropriate  - Encourage maximum independence but intervene and supervise when necessary  - Involve family in performance of ADLs  - Assess for home care needs following discharge   - Consider OT consult to assist with ADL evaluation and planning for discharge  - Provide patient education as appropriate  Outcome: Progressing  Goal: Maintains/Returns to pre admission functional level  Description: INTERVENTIONS:  - Perform AM-PAC 6 Click Basic Mobility/ Daily Activity assessment daily.  - Set and communicate daily mobility goal to care team and patient/family/caregiver.   - Collaborate with rehabilitation services on mobility goals if consulted  - Perform Range of Motion 3 times a day.  - Reposition patient every 2 hours.  - Dangle patient 3 times a day  - Stand patient 3 times a day  - Ambulate patient 3 times a day  - Out of bed to chair 3 times a day   - Out of bed for meals 3 times a day  - Out of bed for toileting  - Record patient progress and toleration of activity level   Outcome: Progressing     Problem: DISCHARGE PLANNING  Goal: Discharge to home or other facility with appropriate resources  Description: INTERVENTIONS:  - Identify barriers to discharge w/patient and caregiver  - Arrange for needed discharge resources and transportation as appropriate  - Identify discharge learning needs (meds, wound care,  etc.)  - Arrange for interpretive services to assist at discharge as needed  - Refer to Case Management Department for coordinating discharge planning if the patient needs post-hospital services based on physician/advanced practitioner order or complex needs related to functional status, cognitive ability, or social support system  Outcome: Progressing     Problem: Knowledge Deficit  Goal: Patient/family/caregiver demonstrates understanding of disease process, treatment plan, medications, and discharge instructions  Description: Complete learning assessment and assess knowledge base.  Interventions:  - Provide teaching at level of understanding  - Provide teaching via preferred learning methods  Outcome: Progressing     Problem: MUSCULOSKELETAL - ADULT  Goal: Maintain proper alignment of affected body part  Description: INTERVENTIONS:  - Support, maintain and protect limb and body alignment  - Provide patient/ family with appropriate education  Outcome: Progressing

## 2023-12-21 NOTE — PROGRESS NOTES
Orthopedic reverse Shoulder Progress Note  ([facility] - Dr Frances)    SUBJECTIVE:  Post-Operative Day:  LOS: 0 days      LEFT reverse  shoulder arthroplasty    Systemic or Specific Complaints: Patient sitting up in bed. Medications covering pain. Pt has been OOB and to bathroom on own.     OBJECTIVE:  Vital signs in last 24 hours:  Temp:  [97.2 °F (36.2 °C)-98.5 °F (36.9 °C)] 97.6 °F (36.4 °C)  HR:  [66-90] 88  Resp:  [12-20] 12  BP: ()/(50-80) 101/54  General: Alert, appears stated age,cooperative   Neurovascular: Neurvascular intact Axillary, Radial, Ulnar and Median Nerves  Radial pulse Plus 2. Sling well fitting   Wound: Incision looks good, Minimal drainage from wound.    Range of Motion: Patient in a sling and well fitting     Data Review  CBC:   Lab Results   Component Value Date    WBC 7.38 10/05/2021    RBC 4.03 10/05/2021    HGB 12.8 10/05/2021    HCT 40.4 10/05/2021     10/05/2021       ASSESSMENT & PLAN:  Status post- LEFT  reverse  shoulder arthroplasty:   Pain Relief: Pain well controled with oral medications.   Continues current post-op course  Activity: OOB as tolerates  Follow TSR ROM protocol  Sling x 4 weeks  PT - Max ER 30 degrees, No Active IR, AAROM FF to 100 degrees  D/C Home with outpatient PT. Please help arrange if not already made.   DVT prophylaxis  Follow up in office in 3 weeks. Appointment should already be made.       Sheron Driscoll PA-C  Date: 12/21/2023  Time: 7:08 AM

## 2023-12-21 NOTE — PROGRESS NOTES
Critical access hospital  Progress Note  Name: Mary Lopez I  MRN: [id number]  Unit/Bed#: E2 -01 I Date of Admission: 12/20/2023   Date of Service: 12/21/2023 I Hospital Day: 0    Assessment/Plan   * Primary osteoarthritis of left shoulder  Assessment & Plan  Degenerative joint disease  Status post reverse total shoulder arthroplasty  Complicated by acute blood loss anemia-baseline 13 down to 8.8.  No signs of active bleeding on exam.  DVT prophylaxis, pain control, dispo per primary team    If patient is able to ambulate safely with minimal symptoms of dizziness/lightheadedness/presyncope, okay for discharge from internal medicine perspective    Acute blood loss anemia  Assessment & Plan  In setting of recent procedure, dilution from IV fluids  If patient were to remain in the hospital for alternative reason, can recheck in the morning.  Otherwise can be checked outside the hospital with her primary care physician in 3 to 4 weeks.    Hypotension  Assessment & Plan  Episode of hypotension on morning labs likely related to dehydration, acute blood loss anemia, fluid shifts  Nursing reports that IV fluid was not running for a significant part of the night  IV fluid resumed and blood pressure improved/normalized  No further workup    Urinary retention  Assessment & Plan  Utilizes oxybutynin as needed  Urinary retention protocol-urinating without issue    Rheumatoid arthritis involving multiple sites with positive rheumatoid factor (HCC)  Assessment & Plan  Utilizes Embrel weekly.  This has been on hold since the 12th and for 2 weeks after procedure.  Takes methotrexate 15 mg total once a week on Saturdays  Utilizes NSAIDs as needed  Follows with rheumatology         VTE Pharmacologic Prophylaxis: Per primary team    Patient Centered Rounds:  Patient care rounds were performed with nursing    Education and Discussions with Family / Patient: patient     Time Spent for Care: I have spent a total time  of 34 minutes on 23 in caring for this patient including Diagnostic results, Risks and benefits of tx options, Instructions for management, Patient and family education, Impressions, Counseling / Coordination of care, Documenting in the medical record, Reviewing / ordering tests, medicine, procedures  , Obtaining or reviewing history  , and Communicating with other healthcare professionals .      Current Length of Stay: 0 day(s)    Current Patient Status: Outpatient Surgery   Certification Statement: The patient will continue to require additional inpatient hospital stay due to post operative care     Discharge Plan: per primary team     Code Status: No Order      Subjective:   Patient seen and evaluated at bedside.  She feels all right.  Is having some shoulder pain and did not sleep well.    Objective:     Vitals:   Temp (24hrs), Av.7 °F (36.5 °C), Min:97.2 °F (36.2 °C), Max:98.5 °F (36.9 °C)    Temp:  [97.2 °F (36.2 °C)-98.5 °F (36.9 °C)] 97.7 °F (36.5 °C)  HR:  [68-90] 84  Resp:  [12-20] 12  BP: ()/(50-80) 101/52  SpO2:  [92 %-100 %] 95 %  Body mass index is 27.46 kg/m².     Input and Output Summary (last 24 hours):       Intake/Output Summary (Last 24 hours) at 2023 1033  Last data filed at 2023 1017  Gross per 24 hour   Intake 1040 ml   Output 815 ml   Net 225 ml       Physical Exam:     Physical Exam  Vitals reviewed.   Constitutional:       General: She is not in acute distress.     Appearance: She is well-developed. She is not ill-appearing, toxic-appearing or diaphoretic.   HENT:      Head: Normocephalic and atraumatic.      Mouth/Throat:      Mouth: Mucous membranes are moist.   Eyes:      General: No scleral icterus.     Extraocular Movements: Extraocular movements intact.   Cardiovascular:      Rate and Rhythm: Normal rate and regular rhythm.      Heart sounds: Normal heart sounds.   Pulmonary:      Effort: Pulmonary effort is normal. No respiratory distress.      Breath  sounds: Normal breath sounds. No wheezing or rales.   Abdominal:      General: There is no distension.      Palpations: Abdomen is soft.      Tenderness: There is no abdominal tenderness. There is no guarding or rebound.   Musculoskeletal:         General: No tenderness or deformity.   Skin:     General: Skin is warm and dry.   Neurological:      General: No focal deficit present.      Mental Status: She is alert. Mental status is at baseline.   Psychiatric:         Mood and Affect: Mood normal.         Behavior: Behavior normal.         Thought Content: Thought content normal.         Judgment: Judgment normal.         Additional Data:     Labs: I have reviewed pertinent results     Results from last 7 days   Lab Units 12/21/23  0853   WBC Thousand/uL 10.31*   HEMOGLOBIN g/dL 8.8*   HEMATOCRIT % 27.0*   PLATELETS Thousands/uL 159     Results from last 7 days   Lab Units 12/21/23  0853   SODIUM mmol/L 135   POTASSIUM mmol/L 4.0   CHLORIDE mmol/L 104   CO2 mmol/L 22   BUN mg/dL 11   CREATININE mg/dL 0.65   ANION GAP mmol/L 9   CALCIUM mg/dL 7.8*   GLUCOSE RANDOM mg/dL 122                         Imaging: I have reviewed pertinent imaging       Recent Cultures (last 7 days):           Last 24 Hours Medication List:   Current Facility-Administered Medications   Medication Dose Route Frequency Provider Last Rate    acetaminophen  650 mg Oral Q6H PRN Sheron Driscoll PA-C      folic acid  1 mg Oral Daily Carl Diaz DO      lactated ringers  125 mL/hr Intravenous Continuous Bladimir Mims  mL/hr (12/21/23 0725)    oxyCODONE  5 mg Oral Q4H PRN Sheron Driscoll PA-C      oxyCODONE  2.5 mg Oral Q4H PRN Sheron Driscoll PA-C          Today, Patient Was Seen By: Carl Diaz DO    ** Please Note: Dictation voice to text software may have been used in the creation of this document. **

## 2023-12-21 NOTE — ASSESSMENT & PLAN NOTE
In setting of recent procedure, dilution from IV fluids  If patient were to remain in the hospital for alternative reason, can recheck in the morning.  Otherwise can be checked outside the hospital with her primary care physician in 3 to 4 weeks.

## 2023-12-21 NOTE — PLAN OF CARE
Problem: PAIN - ADULT  Goal: Verbalizes/displays adequate comfort level or baseline comfort level  Description: Interventions:  - Encourage patient to monitor pain and request assistance  - Assess pain using appropriate pain scale  - Administer analgesics based on type and severity of pain and evaluate response  - Implement non-pharmacological measures as appropriate and evaluate response  - Consider cultural and social influences on pain and pain management  - Notify physician/advanced practitioner if interventions unsuccessful or patient reports new pain  Outcome: Progressing     Problem: SAFETY ADULT  Goal: Maintain or return to baseline ADL function  Description: INTERVENTIONS:  -  Assess patient's ability to carry out ADLs; assess patient's baseline for ADL function and identify physical deficits which impact ability to perform ADLs (bathing, care of mouth/teeth, toileting, grooming, dressing, etc.)  - Assess/evaluate cause of self-care deficits   - Assess range of motion  - Assess patient's mobility; develop plan if impaired  - Assess patient's need for assistive devices and provide as appropriate  - Encourage maximum independence but intervene and supervise when necessary  - Involve family in performance of ADLs  - Assess for home care needs following discharge   - Consider OT consult to assist with ADL evaluation and planning for discharge  - Provide patient education as appropriate  Outcome: Progressing     Problem: Knowledge Deficit  Goal: Patient/family/caregiver demonstrates understanding of disease process, treatment plan, medications, and discharge instructions  Description: Complete learning assessment and assess knowledge base.  Interventions:  - Provide teaching at level of understanding  - Provide teaching via preferred learning methods  Outcome: Progressing     Problem: DISCHARGE PLANNING  Goal: Discharge to home or other facility with appropriate resources  Description: INTERVENTIONS:  -  Identify barriers to discharge w/patient and caregiver  - Arrange for needed discharge resources and transportation as appropriate  - Identify discharge learning needs (meds, wound care, etc.)  - Arrange for interpretive services to assist at discharge as needed  - Refer to Case Management Department for coordinating discharge planning if the patient needs post-hospital services based on physician/advanced practitioner order or complex needs related to functional status, cognitive ability, or social support system  Outcome: Progressing

## 2023-12-21 NOTE — OCCUPATIONAL THERAPY NOTE
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   Pain Assessment   Pain Assessment Tool 0-10   Pain Score 1   Pain Location/Orientation Orientation: Left;Location: Shoulder   Restrictions/Precautions   Weight Bearing Precautions Per Order Yes   RUE Weight Bearing Per Order NWB   Braces or Orthoses Sling  (ABD sling x 4 weeks.)   Other Precautions Fall Risk;Pain  (Follow TSR ROM protocol, Sling x 4 weeks, PT - Max ER 30 degrees, No Active IR, AAROM FF to 100 degrees.)   Home Living   Type of Home House   Home Layout Two level;Ramped entrance;Able to live on main level with bedroom/bathroom   Bathroom Shower/Tub Walk-in shower   Bathroom Toilet Raised   Bathroom Equipment Grab bars in shower;Shower chair   Bathroom Accessibility Accessible;Accessible via walker   Home Equipment Walker;Cane;Wheelchair-manual  (and rollator.)   Prior Function   Level of [place] Independent  with ADLs;Independent with functional mobility;Independent with IADLS   Lives With Spouse   Receives Help From Family   IADLs Independent with meal prep;Independent with driving;Independent with medication management   Falls in the last 6 months 1 to 4   Comments Prior to admission, pt lives with  in a 2 level home with ramp to enter and 1 FOS to B/B. Has a full bath on the first floor with grab bars and chair. Raised toilets. I with ADLS and mobility with no device. Owns a RW, cane and rollator, WC. Shares IADLS. 1 falls.   ADL   Where Assessed Edge of bed   Eating Assistance 6  Modified independent   Grooming Assistance 6  Modified Independent   UB Bathing Assistance 3  Moderate Assistance   LB Bathing Assistance 4  Minimal Assistance   UB Dressing Assistance 3  Moderate Assistance   LB Dressing Assistance 4  Minimal Assistance   Toileting Assistance  5  Supervision/Setup   Bed Mobility   Supine to Sit 5  Supervision   Additional items Assist x 1;Bedrails;HOB elevated;Increased time required;Verbal cues   Additional Comments left seated up in chair following session. call light in cherelle.   Transfers   Sit to Stand 5  Supervision   Additional items Assist x 1;Increased time required;Verbal cues   Stand to Sit 5  Supervision   Additional items Assist x 1;Increased time required;Verbal cues   Functional Mobility   Functional Mobility 5  Supervision   Additional Comments household distances with cane   Additional items SPC   Balance   Static Sitting Good   Dynamic Sitting Fair +   Static Standing Fair   Dynamic Standing Fair -   Ambulatory Poor +   Activity Tolerance   Activity Tolerance Patient tolerated treatment well;Patient limited by fatigue   Medical Staff Made Aware PT   Nurse Made Aware GABRIEL PAGE Assessment   RUE Assessment X  (total shoulder replacement limitations)   LUE Assessment   LUE Assessment WFL   Hand Function   Gross Motor Coordination Functional   Fine Motor Coordination Functional    Psychosocial   Psychosocial (WDL) WDL   Cognition   Overall Cognitive Status WFL   Arousal/Participation Cooperative   Attention Within functional limits   Orientation Level Oriented X4   Memory Within functional limits   Following Commands Follows all commands and directions without difficulty   Comments pleasant and cooperative.   Assessment   Limitation Decreased ADL status;Decreased UE strength;Decreased Safe judgement during ADL;Decreased endurance;Decreased self-care trans;Decreased high-level ADLs   Prognosis Good   Assessment Mary Lopez is a 63 y.o. female seen for OT evaluation s/p admit to [facility] on 12/20/2023 w/ Primary osteoarthritis of left shoulder. S/p Left TRS. Follow TSR ROM protocol, Sling x 4 weeks, PT - Max ER 30 degrees, No Active IR, AAROM FF to 100 degrees. Comorbidities affecting pt's functional performance at time of assessment include:  heumatoid arthritis managed with methotrexate and Enbrel, neurogenic bladder . Pt with active OT orders and activity orders for Up and OOB as tolerated. Personal factors affecting pt at time of IE include:steps within home environment, difficulty performing ADLs, difficulty performing IADLs, and difficulty performing transfers/mobility. Prior to admission, pt lives with  in a 2 level home with ramp to enter and 1 FOS to B/B. Has a full bath on the first floor with grab bars and chair. Raised toilets. I with ADLS and mobility with no device. Owns a RW, cane and rollator, WC. Shares IADLS. 1 falls. Upon evaluation: Pt currently requires modA for UB ADLs, Mel for LB ADLs, supervision for toileting, supervision for bed mobility, supervision for functional transfers, and supervision with cane mobility 2* the following deficits impacting occupational performance:weakness, decreased strength , decreased balance, and decreased activity tolerance. VITALS during session: 101/52 in supine. 121/50 seated EOB.  Pt to benefit from continued skilled OT tx while  in the hospital to address deficits as defined above and maximize level of functional independence w ADL's and functional mobility. Occupational Performance areas to address include: grooming, bathing/shower, toilet hygiene, dressing, functional mobility, and clothing management. From OT standpoint, recommendation at time of d/c would be level 3 resources. OT to follow pt on caseload 3-5x/wk.   Goals   Patient Goals to go home.   STG Time Frame 3-5   Short Term Goal #1 Pt will complete UB dressing/self care w/ mod I using adaptive device and DME as needed   Short Term Goal #2 Pt will complete LB dressing/self care w/ mod I using adaptive device and DME as needed   Short Term Goal  Pt will complete toileting w/ mod I w/ G hygiene/thoroughness using DME as needed   LTG Time Frame 10-14   Long Term Goal #1 Pt will improve functional transfers to Mod I on/off all surfaces using DME as needed w/ G balance/safety   Long Term Goal #2 Pt will improve functional mobility during ADL/IADL/leisure tasks to Mod I using DME as needed w/ G balance/safety   Long Term Goal Pt will participate in simulated IADL management task to increase independence to Mod I w/ G safety and endurance   Plan   Treatment Interventions ADL retraining;Functional transfer training;UE strengthening/ROM;Endurance training;Patient/family training;Equipment evaluation/education;Neuromuscular reeducation;Compensatory technique education;Energy conservation;Activityengagement   Goal Expiration Date 01/04/24   OT Treatment Day 0   OT Frequency 3-5x/wk   Discharge Recommendation   Rehab Resource Intensity Level, OT III (Minimum Resource Intensity)   Additional Comments  The patient's raw score on the -PAC Daily Activity Inpatient Short Form is 19. A raw score of greater than or equal to 19 suggests the patient may benefit from discharge to home. Please refer to the recommendation of the Occupational Therapist for safe discharge planning.   AM-PAC Daily  Activity Inpatient   Lower Body Dressing 3   Bathing 3   Toileting 3   Upper Body Dressing 2   Grooming 4   Eating 4   Daily Activity Raw Score 19   Daily Activity Standardized Score (Calc for Raw Score >=11) 40.22   AM-PAC Applied Cognition Inpatient   Following a Speech/Presentation 4   Understanding Ordinary Conversation 4   Taking Medications 4   Remembering Where Things Are Placed or Put Away 4   Remembering List of 4-5 Errands 4   Taking Care of Complicated Tasks 4   Applied Cognition Raw Score 24   Applied Cognition Standardized Score 62.21   Additional Treatment Session   Start Time 1035   End Time 1056   Treatment Assessment Pt edu on left shoulder precautions, donning / doffing sling and one arm upper body dressing techniques. Pt completed with modA. Tolerated well.   Additional Treatment Day 1   Karen Bolden, OT

## 2023-12-21 NOTE — ASSESSMENT & PLAN NOTE
Episode of hypotension on morning labs likely related to dehydration, acute blood loss anemia, fluid shifts  Nursing reports that IV fluid was not running for a significant part of the night  IV fluid resumed and blood pressure improved/normalized  No further workup

## 2023-12-22 NOTE — DISCHARGE SUMMARY
DISCHARGE SUMMARY      Patient Name: Mary Lopez  Patient MRN: [id number]  Admitting Provider: Damon Frances MD  Discharging Provider: No att. providers found  Primary Care Physician at Discharge: Carl Fermin [phone]  Admission Date: 12/20/2023       Discharge Date: 12/21/2023    Admission Diagnosis  Primary osteoarthritis of left shoulder [M19.012]    Discharge Diagnoses  Principal Problem:    Primary osteoarthritis of left shoulder  Active Problems:    Rheumatoid arthritis involving multiple sites with positive rheumatoid factor (HCC)    Urinary retention    Hypotension    Acute blood loss anemia  Resolved Problems:    * No resolved hospital problems. *      Hospital Course  Mary Lopez was admitted to [facility] on 12/20/2023, with diagnosis of osteoarthritis in her Left Shoulder.     On the day of admission, she was brought to surgery, successfully underwent a Left Shoulder replacement. She tolerated the procedure well. Following surgery, she was taken to the recovery room, at which time, there was a consult placed for [facility] for the patient's medical management.     After a short stay in the recovery room, she was transferred to the orthopedic floor at which time, she was resumed on her routine home medications per the hospitalist group.     On postop day #1, she was able to start some physical therapy and was able to tolerate it well.    At this time, she was in stable condition, showing no sign of deep vein thrombosis or pulmonary embolism. Incision was healing well at the time and showed no signs of infection.     DISCHARGE DIAGNOSIS: Primary osteoarthritis of left shoulder [M19.012]. She is now status post Left total shoulder replacement, which she underwent during the hospital stay.    Medications  See after visit summary for reconciled discharge medications provided to patient and family.      Allergies  Allergies   Allergen Reactions    Tramadol Other (See  Comments)     Hard to wake up    Covid-19 (Subunit) Vaccine Other (See Comments)     myocarditis    Ethylenediamine Itching     Other reaction(s): ETHYLENEDIAMINE (Hives and edema)    Benadryl [Diphenhydramine] Palpitations    Other Rash     Topical cream        Outpatient Follow-Up  No future appointments.      Consults   Medical Management - [facility]  Labs  Lab Results   Component Value Date    HCT 27.0 (L) 12/21/2023     Lab Results   Component Value Date    CALCIUM 7.8 (L) 12/21/2023    K 4.0 12/21/2023    CO2 22 12/21/2023     12/21/2023    BUN 11 12/21/2023    CREATININE 0.65 12/21/2023        Discharge Disposition  Home    Operative Procedures Performed  Procedure(s):  REVERSE TOTAL SHOULDER REPLACEMENT    Discharge Instructions  DVT prophylaxis   WBAT  Follow up x 3 weeks in the office  Suture ends trimmed POD #10 to skin level  ?    Sheron Driscoll PA-C  7:24 PM, 12/21/2023

## 2024-05-07 ENCOUNTER — OFFICE VISIT (OUTPATIENT)
Dept: PODIATRY | Facility: CLINIC | Age: 64
End: 2024-05-07
Payer: COMMERCIAL

## 2024-05-07 VITALS — HEART RATE: 100 BPM | SYSTOLIC BLOOD PRESSURE: 112 MMHG | DIASTOLIC BLOOD PRESSURE: 68 MMHG

## 2024-05-07 DIAGNOSIS — M79.671 RIGHT FOOT PAIN: ICD-10-CM

## 2024-05-07 DIAGNOSIS — M20.42 HAMMERTOE, BILATERAL: ICD-10-CM

## 2024-05-07 DIAGNOSIS — M20.11 HALLUX ABDUCTO VALGUS, RIGHT: ICD-10-CM

## 2024-05-07 DIAGNOSIS — M20.41 HAMMERTOE, BILATERAL: ICD-10-CM

## 2024-05-07 DIAGNOSIS — M79.672 LEFT FOOT PAIN: Primary | ICD-10-CM

## 2024-05-07 PROCEDURE — 99203 OFFICE O/P NEW LOW 30 MIN: CPT | Performed by: PODIATRIST

## 2024-05-07 NOTE — LETTER
May 7, 2024     Dawit Bullock MD  [address] [address]    Patient: Mary Lopez   YOB: 1960   Date of Visit: 5/7/2024       Dear Dr. Bullock:    Thank you for referring Mary Lopez to me for evaluation. Below are my notes for this consultation.    If you have questions, please do not hesitate to call me. I look forward to following your patient along with you.         Sincerely,        Luis Enrique Pratt DPM        CC: No Recipients    Luis Enrique Pratt DPM  5/7/2024  2:52 PM  Sign when Signing Visit  Assessment/Plan:        Diagnoses and all orders for this visit:    Left foot pain  -     Cancel: X-ray foot left 3+ views; Future    Right foot pain  -     Cancel: X-ray foot right 3+ views; Future    Hammertoe, bilateral    Hallux abducto valgus, right     Diagnosis and options discussed with patient  Patient agreeable to the plan as stated below    Extensive chart review. Previous spinal cord injury with ambulatory dysfunction. Surgical reconstruction of her toe deformities would be risky. In addition, she also has RA and is on Embrel and methotrexate.     We spent a good deal of time today discussing the benefits and real risk of orthopedic surgery on digits with her comorbid conditions. She is not in much pain from the deformity currently. The best course from what I see today would be to recommend conservative care with silipos toe padding, toe crest, wide toe box sneakers. ETC. Recovery from surgery would be very difficult for her and has the potential to create new foot issues, even if the surgeyr is successful. We could consider flexor tenotomy in office to reduce the toe contracture but I am worried her extensor spasticity from the spinal cord injury will likely cause further dorsal deviation of the toes. She was appreciative of my recommendations and advice. She stated I gave her a lot to think about. If pain becomes more consistent and interferes with her  recovery, we could consider more aggressive options    XR reviewed with patient, see my report below.     Subjective:     Patient ID: Mary Lopez is a 63 y.o. female.    Patient presents with bunions and hammertoes on both feet, the 3rd toe is the most deformed. They do not cause pain. Sometimes the ote does rub. She had a spinal cord injury, she can walk but her gait is still abnormal. She also has RA since she was 20. She takes embrel and methotrexate.         The following portions of the patient's history were reviewed and updated as appropriate: allergies, current medications, past family history, past medical history, past social history, past surgical history, and problem list.    Review of Systems   As stated in HPI, otherwise normal      Objective:      /68 (BP Location: Right arm, Patient Position: Sitting, Cuff Size: Standard)   Pulse 100         Physical Exam  Vitals reviewed.   Constitutional:       Appearance: She is not ill-appearing or diaphoretic.   Cardiovascular:      Rate and Rhythm: Normal rate.      Pulses: Normal pulses.           Dorsalis pedis pulses are 2+ on the right side and 2+ on the left side.        Posterior tibial pulses are 2+ on the right side and 2+ on the left side.   Pulmonary:      Effort: Pulmonary effort is normal. No respiratory distress.   Musculoskeletal:      Right foot: Decreased range of motion. Deformity (flexor tendon is spastic B/L lesser digits with IPJ contractures. NO pain), bunion and foot drop (mild) present.      Left foot: Decreased range of motion. Deformity and foot drop present. No bunion.   Feet:      Right foot:      Protective Sensation: 10 sites tested.  10 sites sensed.      Left foot:      Protective Sensation: 10 sites tested.  10 sites sensed.   Skin:     Capillary Refill: Capillary refill takes less than 2 seconds.      Findings: No erythema or rash.   Neurological:      Mental Status: She is alert and oriented to person, place, and time.       Sensory: No sensory deficit.      Gait: Gait abnormal.   Psychiatric:         Mood and Affect: Mood normal.            XRay 3 views of the right and left foot personally read by Dr. Pratt in office today and discussed with patient:    S/p right 5th arthroplasty  Right IM angle 18, sesamoid position 5  Joint spaces relatively normal.  Hammertoe contractures noted lesser digits

## 2024-05-07 NOTE — PATIENT INSTRUCTIONS
"Go to [employer] and type \"silipos toe pads.\" These soft toe spacers can reduce toes from rubbing to the pain can improve.    "

## 2024-05-07 NOTE — PROGRESS NOTES
Assessment/Plan:         Diagnoses and all orders for this visit:    Left foot pain  -     Cancel: X-ray foot left 3+ views; Future    Right foot pain  -     Cancel: X-ray foot right 3+ views; Future    Hammertoe, bilateral    Hallux abducto valgus, right      Diagnosis and options discussed with patient  Patient agreeable to the plan as stated below    Extensive chart review. Previous spinal cord injury with ambulatory dysfunction. Surgical reconstruction of her toe deformities would be risky. In addition, she also has RA and is on Embrel and methotrexate.     We spent a good deal of time today discussing the benefits and real risk of orthopedic surgery on digits with her comorbid conditions. She is not in much pain from the deformity currently. The best course from what I see today would be to recommend conservative care with silipos toe padding, toe crest, wide toe box sneakers. ETC. Recovery from surgery would be very difficult for her and has the potential to create new foot issues, even if the surgeyr is successful. We could consider flexor tenotomy in office to reduce the toe contracture but I am worried her extensor spasticity from the spinal cord injury will likely cause further dorsal deviation of the toes. She was appreciative of my recommendations and advice. She stated I gave her a lot to think about. If pain becomes more consistent and interferes with her recovery, we could consider more aggressive options    XR reviewed with patient, see my report below.     Subjective:      Patient ID: Mary Lopez is a 63 y.o. female.    Patient presents with bunions and hammertoes on both feet, the 3rd toe is the most deformed. They do not cause pain. Sometimes the ote does rub. She had a spinal cord injury, she can walk but her gait is still abnormal. She also has RA since she was 20. She takes embrel and methotrexate.         The following portions of the patient's history were reviewed and updated as  appropriate: allergies, current medications, past family history, past medical history, past social history, past surgical history, and problem list.    Review of Systems    As stated in HPI, otherwise normal      Objective:      /68 (BP Location: Right arm, Patient Position: Sitting, Cuff Size: Standard)   Pulse 100          Physical Exam  Vitals reviewed.   Constitutional:       Appearance: She is not ill-appearing or diaphoretic.   Cardiovascular:      Rate and Rhythm: Normal rate.      Pulses: Normal pulses.           Dorsalis pedis pulses are 2+ on the right side and 2+ on the left side.        Posterior tibial pulses are 2+ on the right side and 2+ on the left side.   Pulmonary:      Effort: Pulmonary effort is normal. No respiratory distress.   Musculoskeletal:      Right foot: Decreased range of motion. Deformity (flexor tendon is spastic B/L lesser digits with IPJ contractures. NO pain), bunion and foot drop (mild) present.      Left foot: Decreased range of motion. Deformity and foot drop present. No bunion.   Feet:      Right foot:      Protective Sensation: 10 sites tested.  10 sites sensed.      Left foot:      Protective Sensation: 10 sites tested.  10 sites sensed.   Skin:     Capillary Refill: Capillary refill takes less than 2 seconds.      Findings: No erythema or rash.   Neurological:      Mental Status: She is alert and oriented to person, place, and time.      Sensory: No sensory deficit.      Gait: Gait abnormal.   Psychiatric:         Mood and Affect: Mood normal.             XRay 3 views of the right and left foot personally read by Dr. Pratt in office today and discussed with patient:    S/p right 5th arthroplasty  Right IM angle 18, sesamoid position 5  Joint spaces relatively normal.  Hammertoe contractures noted lesser digits

## 2024-11-05 ENCOUNTER — HOSPITAL ENCOUNTER (INPATIENT)
Facility: HOSPITAL | Age: 64
LOS: 1 days | Discharge: HOME | DRG: 543 | End: 2024-11-06
Attending: EMERGENCY MEDICINE | Admitting: HOSPITALIST
Payer: COMMERCIAL

## 2024-11-05 ENCOUNTER — APPOINTMENT (EMERGENCY)
Dept: RADIOLOGY | Facility: HOSPITAL | Age: 64
DRG: 543 | End: 2024-11-05
Payer: COMMERCIAL

## 2024-11-05 DIAGNOSIS — Z96.612 S/P REVERSE TOTAL SHOULDER ARTHROPLASTY, LEFT: ICD-10-CM

## 2024-11-05 DIAGNOSIS — S42.302A CLOSED FRACTURE OF SHAFT OF LEFT HUMERUS: Primary | ICD-10-CM

## 2024-11-05 PROBLEM — M80.00XA AGE-RELATED OSTEOPOROSIS WITH CURRENT PATHOLOGICAL FRACTURE: Status: ACTIVE | Noted: 2024-11-05

## 2024-11-05 LAB
ALBUMIN SERPL BCG-MCNC: 3.9 G/DL (ref 3.5–5)
ALP SERPL-CCNC: 79 U/L (ref 34–104)
ALT SERPL W P-5'-P-CCNC: 14 U/L (ref 7–52)
ANION GAP SERPL CALCULATED.3IONS-SCNC: 6 MMOL/L (ref 4–13)
AST SERPL W P-5'-P-CCNC: 19 U/L (ref 13–39)
BILIRUB SERPL-MCNC: 0.5 MG/DL (ref 0.2–1)
BUN SERPL-MCNC: 12 MG/DL (ref 5–25)
CALCIUM SERPL-MCNC: 8.8 MG/DL (ref 8.4–10.2)
CHLORIDE SERPL-SCNC: 103 MMOL/L (ref 96–108)
CO2 SERPL-SCNC: 26 MMOL/L (ref 21–32)
CREAT SERPL-MCNC: 0.81 MG/DL (ref 0.6–1.3)
ERYTHROCYTE [DISTWIDTH] IN BLOOD BY AUTOMATED COUNT: 13.5 % (ref 11.6–15.1)
GFR SERPL CREATININE-BSD FRML MDRD: 78 ML/MIN/1.73SQ M
GLUCOSE SERPL-MCNC: 117 MG/DL (ref 65–140)
HCT VFR BLD AUTO: 37.8 % (ref 34.8–46.1)
HGB BLD-MCNC: 12.4 G/DL (ref 11.5–15.4)
MAGNESIUM SERPL-MCNC: 1.9 MG/DL (ref 1.9–2.7)
MCH RBC QN AUTO: 31.6 PG (ref 26.8–34.3)
MCHC RBC AUTO-ENTMCNC: 32.8 G/DL (ref 31.4–37.4)
MCV RBC AUTO: 96 FL (ref 82–98)
PLATELET # BLD AUTO: 212 THOUSANDS/UL (ref 149–390)
PMV BLD AUTO: 9.8 FL (ref 8.9–12.7)
POTASSIUM SERPL-SCNC: 4.1 MMOL/L (ref 3.5–5.3)
PROT SERPL-MCNC: 6.8 G/DL (ref 6.4–8.4)
RBC # BLD AUTO: 3.92 MILLION/UL (ref 3.81–5.12)
SODIUM SERPL-SCNC: 135 MMOL/L (ref 135–147)
WBC # BLD AUTO: 11.05 THOUSAND/UL (ref 4.31–10.16)

## 2024-11-05 PROCEDURE — 96376 TX/PRO/DX INJ SAME DRUG ADON: CPT

## 2024-11-05 PROCEDURE — 85027 COMPLETE CBC AUTOMATED: CPT

## 2024-11-05 PROCEDURE — 96374 THER/PROPH/DIAG INJ IV PUSH: CPT

## 2024-11-05 PROCEDURE — 99223 1ST HOSP IP/OBS HIGH 75: CPT

## 2024-11-05 PROCEDURE — 99284 EMERGENCY DEPT VISIT MOD MDM: CPT

## 2024-11-05 PROCEDURE — 99285 EMERGENCY DEPT VISIT HI MDM: CPT | Performed by: EMERGENCY MEDICINE

## 2024-11-05 PROCEDURE — 96375 TX/PRO/DX INJ NEW DRUG ADDON: CPT

## 2024-11-05 PROCEDURE — 73080 X-RAY EXAM OF ELBOW: CPT

## 2024-11-05 PROCEDURE — 73060 X-RAY EXAM OF HUMERUS: CPT

## 2024-11-05 PROCEDURE — 29105 APPLICATION LONG ARM SPLINT: CPT | Performed by: EMERGENCY MEDICINE

## 2024-11-05 PROCEDURE — 80053 COMPREHEN METABOLIC PANEL: CPT

## 2024-11-05 PROCEDURE — 73030 X-RAY EXAM OF SHOULDER: CPT

## 2024-11-05 PROCEDURE — 83735 ASSAY OF MAGNESIUM: CPT

## 2024-11-05 RX ORDER — IBUPROFEN 600 MG/1
600 TABLET, FILM COATED ORAL 2 TIMES DAILY
Status: DISCONTINUED | OUTPATIENT
Start: 2024-11-05 | End: 2024-11-06

## 2024-11-05 RX ORDER — AMOXICILLIN 250 MG
2 CAPSULE ORAL
Status: DISCONTINUED | OUTPATIENT
Start: 2024-11-05 | End: 2024-11-06 | Stop reason: HOSPADM

## 2024-11-05 RX ORDER — HYDROMORPHONE HCL IN WATER/PF 6 MG/30 ML
0.2 PATIENT CONTROLLED ANALGESIA SYRINGE INTRAVENOUS ONCE
Status: COMPLETED | OUTPATIENT
Start: 2024-11-05 | End: 2024-11-05

## 2024-11-05 RX ORDER — ACETAMINOPHEN 325 MG/1
975 TABLET ORAL EVERY 8 HOURS SCHEDULED
Status: DISCONTINUED | OUTPATIENT
Start: 2024-11-05 | End: 2024-11-06 | Stop reason: HOSPADM

## 2024-11-05 RX ORDER — CYCLOBENZAPRINE HCL 10 MG
10 TABLET ORAL 3 TIMES DAILY
Status: DISCONTINUED | OUTPATIENT
Start: 2024-11-05 | End: 2024-11-06

## 2024-11-05 RX ORDER — HYDROMORPHONE HCL IN WATER/PF 6 MG/30 ML
0.2 PATIENT CONTROLLED ANALGESIA SYRINGE INTRAVENOUS EVERY 4 HOURS PRN
Status: DISCONTINUED | OUTPATIENT
Start: 2024-11-05 | End: 2024-11-06 | Stop reason: HOSPADM

## 2024-11-05 RX ORDER — POLYETHYLENE GLYCOL 3350 17 G/17G
17 POWDER, FOR SOLUTION ORAL DAILY
Status: DISCONTINUED | OUTPATIENT
Start: 2024-11-06 | End: 2024-11-06 | Stop reason: HOSPADM

## 2024-11-05 RX ORDER — ONDANSETRON 2 MG/ML
4 INJECTION INTRAMUSCULAR; INTRAVENOUS EVERY 6 HOURS PRN
Status: DISCONTINUED | OUTPATIENT
Start: 2024-11-05 | End: 2024-11-06 | Stop reason: HOSPADM

## 2024-11-05 RX ORDER — OXYCODONE HYDROCHLORIDE 5 MG/1
5 TABLET ORAL EVERY 4 HOURS PRN
Status: DISCONTINUED | OUTPATIENT
Start: 2024-11-05 | End: 2024-11-06 | Stop reason: HOSPADM

## 2024-11-05 RX ORDER — HYDROMORPHONE HCL/PF 1 MG/ML
0.5 SYRINGE (ML) INJECTION ONCE
Status: COMPLETED | OUTPATIENT
Start: 2024-11-05 | End: 2024-11-05

## 2024-11-05 RX ADMIN — HYDROMORPHONE HYDROCHLORIDE 0.2 MG: 0.2 INJECTION, SOLUTION INTRAMUSCULAR; INTRAVENOUS; SUBCUTANEOUS at 17:50

## 2024-11-05 RX ADMIN — HYDROMORPHONE HYDROCHLORIDE 0.2 MG: 0.2 INJECTION, SOLUTION INTRAMUSCULAR; INTRAVENOUS; SUBCUTANEOUS at 16:59

## 2024-11-05 RX ADMIN — HYDROMORPHONE HYDROCHLORIDE 0.5 MG: 1 INJECTION, SOLUTION INTRAMUSCULAR; INTRAVENOUS; SUBCUTANEOUS at 18:52

## 2024-11-05 RX ADMIN — IBUPROFEN 600 MG: 600 TABLET, FILM COATED ORAL at 21:24

## 2024-11-05 RX ADMIN — CYCLOBENZAPRINE 10 MG: 10 TABLET, FILM COATED ORAL at 21:24

## 2024-11-05 RX ADMIN — ACETAMINOPHEN 975 MG: 325 TABLET, FILM COATED ORAL at 22:57

## 2024-11-05 RX ADMIN — SENNOSIDES AND DOCUSATE SODIUM 2 TABLET: 8.6; 5 TABLET ORAL at 21:24

## 2024-11-05 NOTE — ED PROVIDER NOTES
Time reflects when diagnosis was documented in both MDM as applicable and the Disposition within this note       Time User Action Codes Description Comment    11/5/2024  7:47 PM PasqualeLeonor washington Add [S42.302A] Closed fracture of shaft of left humerus     11/5/2024  7:48 PM Leonor Giordano Add [Z96.612] S/P reverse total shoulder arthroplasty, left           ED Disposition       ED Disposition   Admit    Condition   Stable    Date/Time   Tue Nov 5, 2024  7:46 PM    Comment   Case was discussed with TUNG and the patient's admission status was agreed to be Admission Status: inpatient status to the service of Dr. Stone.               Assessment & Plan       Medical Decision Making  A 64-year-old female presents with left upper arm pain following a mechanical fall that occurred just prior to arrival.  She denies head strike and LOC.  She is neurologically intact.  Will proceed with imaging to rule out traumatic injury.  Will provide pain medications.    Amount and/or Complexity of Data Reviewed  Radiology: ordered and independent interpretation performed.    Risk  Prescription drug management.  Decision regarding hospitalization.        ED Course as of 11/05/24 2022 Tue Nov 05, 2024 1818 Spoke with Dr. Narayanan, orthopedics, reviewing pt's history, presentation and work up.  Recommends placing coap with posterior long arm splint, OP follow up with her surgeon.   1834 Pt updated on XR results and recommendations by orthopedics.  She expresses concern due to ongoing pain regardless of IV pain meds and concerns to care for herself at home.  Discussed dispo options, pt would prefer admission for pain control and ortho consult tomorrow.   1945 Spoke with TUNG, discussed pt's history, presentation and work up.  Will accept in admission.        Medications   ondansetron (ZOFRAN) injection 4 mg (has no administration in time range)   acetaminophen (TYLENOL) tablet 975 mg (has no administration in time range)   cyclobenzaprine  (FLEXERIL) tablet 10 mg (has no administration in time range)   ibuprofen (MOTRIN) tablet 600 mg (has no administration in time range)   oxyCODONE (ROXICODONE) split tablet 2.5 mg (has no administration in time range)   oxyCODONE (ROXICODONE) IR tablet 5 mg (has no administration in time range)   HYDROmorphone HCl (DILAUDID) injection 0.2 mg (has no administration in time range)   senna-docusate sodium (SENOKOT S) 8.6-50 mg per tablet 2 tablet (has no administration in time range)   polyethylene glycol (MIRALAX) packet 17 g (has no administration in time range)   HYDROmorphone HCl (DILAUDID) injection 0.2 mg (0.2 mg Intravenous Given 11/5/24 1659)   HYDROmorphone HCl (DILAUDID) injection 0.2 mg (0.2 mg Intravenous Given 11/5/24 1750)   HYDROmorphone (DILAUDID) injection 0.5 mg (0.5 mg Intravenous Given 11/5/24 1852)       ED Risk Strat Scores                           SBIRT 22yo+      Flowsheet Row Most Recent Value   Initial Alcohol Screen: US AUDIT-C     1. How often do you have a drink containing alcohol? 0 Filed at: 11/05/2024 1651   2. How many drinks containing alcohol do you have on a typical day you are drinking?  0 Filed at: 11/05/2024 1651   3a. Male UNDER 65: How often do you have five or more drinks on one occasion? 0 Filed at: 11/05/2024 1651   3b. FEMALE Any Age, or MALE 65+: How often do you have 4 or more drinks on one occassion? 0 Filed at: 11/05/2024 1651   Audit-C Score 0 Filed at: 11/05/2024 1651   NAHID: How many times in the past year have you...    Used an illegal drug or used a prescription medication for non-medical reasons? Never Filed at: 11/05/2024 1651                            History of Present Illness       Chief Complaint   Patient presents with    Arm Injury     Pt had mechanical fall onto L arm, reports L shoulder replacement in past along with neck plate in neck.  Pt reporting pins and needle sensation in LLE with full ROM of hand and fingers.        Past Medical History:    Diagnosis Date    Anemia     Anxiety     Arthritis     Coronary artery disease     Muscle weakness     left side    Myocarditis associated with COVID-19 vaccination (HCC)     now resolved    Neurogenic bladder     Rheumatoid arthritis (HCC)     Septic arthritis of shoulder, left (HCC)     MSSA-cervical spine, lumbar spine    UTI (urinary tract infection)       Past Surgical History:   Procedure Laterality Date    CERVICAL FUSION      FL GUIDED NEEDLE PLAC BX/ASP/INJ  1/16/2021    FOOT SURGERY Bilateral     HERNIA REPAIR      as child    NM ARTHROPLASTY GLENOHUMERAL JOINT TOTAL SHOULDER Left 12/20/2023    Procedure: REVERSE TOTAL SHOULDER REPLACEMENT;  Surgeon: Damon Frances MD;  Location: [facility];  Service: Orthopedics      History reviewed. No pertinent family history.   Social History     Tobacco Use    Smoking status: Never     Passive exposure: Never    Smokeless tobacco: Never   Vaping Use    Vaping status: Never Used   Substance Use Topics    Alcohol use: Yes     Comment: socially     Drug use: Never      E-Cigarette/Vaping    E-Cigarette Use Never User       E-Cigarette/Vaping Substances    Nicotine No     THC No     CBD No     Flavoring No     Other No     Unknown No       I have reviewed and agree with the history as documented.     A 64-year-old female with past medical history of RA and CAD; presents with left upper arm pain following a fall that occurred just prior to arrival.  Patient states her right foot got caught on the concrete, causing her to fall forward.  Patient subsequently landed on her extended left arm with immediate pain to the left upper arm.  Patient denies head strike and loss of consciousness.  She currently denies headache, dizziness, lightheadedness, neck pain, back pain, chest pain, shortness of breath, abdominal pain, nausea, vomiting, diarrhea, peripheral edema and rashes.  Patient does have a prior left shoulder replacement.      History provided by:  Patient and medical  records  Arm Injury      Review of Systems   Musculoskeletal:  Positive for arthralgias.   All other systems reviewed and are negative.      Objective       ED Triage Vitals   Temperature Pulse Blood Pressure Respirations SpO2 Patient Position - Orthostatic VS   11/05/24 1647 11/05/24 1647 11/05/24 1647 11/05/24 1647 11/05/24 1647 11/05/24 1647   98.2 °F (36.8 °C) 81 157/68 18 100 % Lying      Temp Source Heart Rate Source BP Location FiO2 (%) Pain Score    11/05/24 1647 11/05/24 1647 11/05/24 1647 -- 11/05/24 1659    Oral Monitor Right arm  8      Vitals      Date and Time Temp Pulse SpO2 Resp BP Pain Score FACES Pain Rating User   11/05/24 1852 -- -- -- -- -- 9 -- EG   11/05/24 1820 -- -- -- -- -- 8 -- MF   11/05/24 1750 -- -- -- -- -- 8 -- MF   11/05/24 1659 -- -- -- -- -- 8 -- MF   11/05/24 1647 98.2 °F (36.8 °C) 81 100 % 18 157/68 -- -- MF            Physical Exam  General Appearance: alert and oriented, nad, non toxic appearing  Skin:  Warm, dry, intact.  No cyanosis  HEENT: Atraumatic, normocephalic.  No eye drainage.  Normal hearing.  Moist mucous membranes.    Neck: Supple, trachea midline.  No midline cervical spine tenderness  Cardiac: RRR; no murmurs, rub, gallops.  No pedal edema, 2+ pulses  Pulmonary: lungs CTAB; no wheezes, rales, rhonchi  Gastrointestinal: abdomen soft, nontender, nondistended; no guarding or rebound tenderness; good bowel sounds, no mass or bruits  Extremities: No midline spinal tenderness.  Left mid humerus tender to palpation with associated soft tissue swelling.  Remainder of left upper extremity nontender with guarded range of motion due to pain.  5/5 motor strength through LUE with intact sensation.  Right upper extremity and bilateral lower extremities nontender with full range of motion.  No deformities.  No calf tenderness, no clubbing  Neuro:  no focal motor or sensory deficits, CN 2-12 grossly intact  Psych:  Normal mood and affect, normal judgement and insight        Results Reviewed       Procedure Component Value Units Date/Time    Platelet count [[id number]]     Lab Status: No result Specimen: Blood     Comprehensive metabolic panel [[id number]]     Lab Status: No result Specimen: Blood     CBC and Platelet [[id number]]     Lab Status: No result Specimen: Blood     Magnesium [[id number]]     Lab Status: No result Specimen: Blood             XR humerus LEFT   ED Interpretation by Leonor Giordano DO (11/05 1813)   Mid shaft page-prosthetic fracture.  Orthopedic hardware in place    Image independently interpreted by myself       XR shoulder 2+ views LEFT   ED Interpretation by Leonor Giordano DO (11/05 1813)   Mid shaft humeral page-prosthetic fracture    Image independently interpreted by myself       XR elbow 3+ views LEFT   ED Interpretation by Leonor Giordano DO (11/05 1813)   No acute fracture or dislocation    Image independently interpreted by myself            Orthopedic injury treatment    Date/Time: 11/5/2024 7:21 PM    Performed by: Leonor Giordano DO  Authorized by: Leonor Giordano DO    Patient Location:  ED  [place] Protocol:  Consent: Verbal consent obtained.  Risks and benefits: risks, benefits and alternatives were discussed  Consent given by: patient  Patient understanding: patient states understanding of the procedure being performed    Injury location:  Upper arm  Location details:  Left upper arm  Injury type:  Fracture  Fracture type: humeral shaft    Neurovascular status: Neurovascularly intact    Distal perfusion: normal    Neurological function: normal    Range of motion: reduced    Range of motion comment:  Secondary to pain  Manipulation performed?: No    Immobilization:  Splint  Splint type:  Long arm (Coap splint with posterior long arm)  Supplies used:  Cotton padding, Ortho-Glass and elastic bandage  Neurovascular status: Neurovascularly intact    Distal perfusion: normal    Neurological function: normal    Range of motion: unchanged     Patient tolerance:  Patient tolerated the procedure well with no immediate complications      ED Medication and Procedure Management   Prior to Admission Medications   Prescriptions Last Dose Informant Patient Reported? Taking?   Cholecalciferol (Vitamin D-3) 125 MCG (5000 UT) TABS   Yes No   Sig: Take by mouth in the morning   Enbrel 50 MG/ML SOSY   Yes No   Sig: Inject 50 mg under the skin   ONABOTULINUMTOXINA IJ   Yes No   Sig: Inject as directed every 3 (three) months.   Premarin vaginal cream   Yes No   Sig: Insert into the vagina   acetaminophen (TYLENOL) 325 mg tablet   No No   Sig: Take 2 tablets (650 mg total) by mouth every 6 (six) hours as needed for mild pain   cycloSPORINE (RESTASIS) 0.05 % ophthalmic emulsion   Yes No   Sig: Apply 1 drop to eye 2 (two) times a day   folic acid (FOLVITE) 1 mg tablet   Yes No   Sig: Take by mouth daily   methotrexate 2.5 mg tablet   Yes No   Sig: Take 15 mg by mouth   oxybutynin (DITROPAN-XL) 10 MG 24 hr tablet   Yes No   Sig: Take 10 mg by mouth if needed (for long trips)      Facility-Administered Medications: None     Patient's Medications   Discharge Prescriptions    No medications on file     No discharge procedures on file.  ED SEPSIS DOCUMENTATION   Time reflects when diagnosis was documented in both MDM as applicable and the Disposition within this note       Time User Action Codes Description Comment    11/5/2024  7:47 PM Leonor Giordano [S42.302A] Closed fracture of shaft of left humerus     11/5/2024  7:48 PM Leonor Giordano [Z96.612] S/P reverse total shoulder arthroplasty, left                  Leonor Giordano DO  11/05/24 2022

## 2024-11-05 NOTE — ED NOTES
Provider messaged for more pain medication per pt request at this time.      Shona Osei, GABRIEL  11/05/24 8391

## 2024-11-06 VITALS
SYSTOLIC BLOOD PRESSURE: 104 MMHG | RESPIRATION RATE: 18 BRPM | WEIGHT: 152.78 LBS | DIASTOLIC BLOOD PRESSURE: 51 MMHG | TEMPERATURE: 98.7 F | OXYGEN SATURATION: 98 % | BODY MASS INDEX: 27.07 KG/M2 | HEIGHT: 63 IN | HEART RATE: 70 BPM

## 2024-11-06 PROBLEM — Z87.828 HISTORY OF SPINAL CORD INJURY: Status: ACTIVE | Noted: 2024-11-06

## 2024-11-06 PROBLEM — M20.10 HALLUX ABDUCTO VALGUS: Status: ACTIVE | Noted: 2024-11-06

## 2024-11-06 PROCEDURE — 97162 PT EVAL MOD COMPLEX 30 MIN: CPT

## 2024-11-06 PROCEDURE — 97166 OT EVAL MOD COMPLEX 45 MIN: CPT

## 2024-11-06 PROCEDURE — 99239 HOSP IP/OBS DSCHRG MGMT >30: CPT | Performed by: INTERNAL MEDICINE

## 2024-11-06 RX ORDER — CYCLOBENZAPRINE HCL 10 MG
10 TABLET ORAL 3 TIMES DAILY PRN
Status: DISCONTINUED | OUTPATIENT
Start: 2024-11-06 | End: 2024-11-06 | Stop reason: HOSPADM

## 2024-11-06 RX ORDER — OXYCODONE HYDROCHLORIDE 5 MG/1
2.5 TABLET ORAL EVERY 4 HOURS PRN
Qty: 10 TABLET | Refills: 0 | Status: SHIPPED | OUTPATIENT
Start: 2024-11-06 | End: 2024-11-26

## 2024-11-06 RX ORDER — IBUPROFEN 600 MG/1
600 TABLET, FILM COATED ORAL DAILY PRN
Start: 2024-11-06

## 2024-11-06 RX ORDER — CYCLOBENZAPRINE HCL 10 MG
10 TABLET ORAL DAILY PRN
Qty: 5 TABLET | Refills: 0 | Status: SHIPPED | OUTPATIENT
Start: 2024-11-06

## 2024-11-06 RX ORDER — POLYETHYLENE GLYCOL 3350 17 G/17G
17 POWDER, FOR SOLUTION ORAL DAILY
Start: 2024-11-07

## 2024-11-06 RX ORDER — ACETAMINOPHEN 325 MG/1
975 TABLET ORAL EVERY 8 HOURS SCHEDULED
Qty: 63 TABLET | Refills: 0 | Status: SHIPPED | OUTPATIENT
Start: 2024-11-06 | End: 2024-11-13

## 2024-11-06 RX ADMIN — Medication 2.5 MG: at 02:49

## 2024-11-06 RX ADMIN — Medication 2.5 MG: at 09:02

## 2024-11-06 RX ADMIN — ACETAMINOPHEN 975 MG: 325 TABLET, FILM COATED ORAL at 06:02

## 2024-11-06 RX ADMIN — CYCLOBENZAPRINE 10 MG: 10 TABLET, FILM COATED ORAL at 09:03

## 2024-11-06 RX ADMIN — POLYETHYLENE GLYCOL 3350 17 G: 17 POWDER, FOR SOLUTION ORAL at 09:03

## 2024-11-06 RX ADMIN — IBUPROFEN 600 MG: 600 TABLET, FILM COATED ORAL at 09:03

## 2024-11-06 NOTE — UTILIZATION REVIEW
Initial Clinical Review    Admission: Date/Time/Statement:   Admission Orders (From admission, onward)       Ordered        11/05/24 1948  INPATIENT ADMISSION  Once                          Orders Placed This Encounter   Procedures    INPATIENT ADMISSION     Standing Status:   Standing     Number of Occurrences:   1     Order Specific Question:   Level of Care     Answer:   Med Surg [16]     Order Specific Question:   Estimated length of stay     Answer:   More than 2 Midnights     Order Specific Question:   Certification     Answer:   I certify that inpatient services are medically necessary for this patient for a duration of greater than two midnights. See H&P and MD Progress Notes for additional information about the patient's course of treatment.     ED Arrival Information       Expected   -    Arrival   11/5/2024 16:41    Acuity   Urgent              Means of arrival   Ambulance    Escorted by   [facility]    Service   Hospitalist    Admission type   Emergency              Arrival complaint   fall             Chief Complaint   Patient presents with    Arm Injury     Pt had mechanical fall onto L arm, reports L shoulder replacement in past along with neck plate in neck.  Pt reporting pins and needle sensation in LLE with full ROM of hand and fingers.        Initial Presentation: 64 y.o. female presents to ED via  EMS  from home with  left arm pain  after a  mechanical fall.  Imaging  shows fracture left humerus. Paitent is  S/P left  shoulder  reconstruction   12/23.   Additional PMH  is   RA and OA.   Ortho consulted in ED and recommended  Op F/U.  Pain uncontrolled and unable to d/c.   Admit  Ip with  Closed fracture left humerus  and plan is  pain control, ortho consult, monitor labs,  NWB  left arm and continue home meds.       Anticipated Length of Stay/Certification Statement: Patient will be admitted on an inpatient basis with an anticipated length of stay of greater than 2 midnights secondary to  treatment for acute pain.       Date:   11/6   Day 2:   D/C  home    ED Treatment-Medication Administration from 11/05/2024 1641 to 11/05/2024 2047         Date/Time Order Dose Route Action     11/05/2024 1659 HYDROmorphone HCl (DILAUDID) injection 0.2 mg 0.2 mg Intravenous Given     11/05/2024 1750 HYDROmorphone HCl (DILAUDID) injection 0.2 mg 0.2 mg Intravenous Given     11/05/2024 1852 HYDROmorphone (DILAUDID) injection 0.5 mg 0.5 mg Intravenous Given            Scheduled Medications:  acetaminophen, 975 mg, Oral, Q8H JOSE E  polyethylene glycol, 17 g, Oral, Daily  senna-docusate sodium, 2 tablet, Oral, HS      Continuous IV Infusions:     PRN Meds:  cyclobenzaprine, 10 mg, Oral, TID PRN  HYDROmorphone, 0.2 mg, Intravenous, Q4H PRN  ondansetron, 4 mg, Intravenous, Q6H PRN  oxyCODONE, 5 mg, Oral, Q4H PRN  oxyCODONE, 2.5 mg, Oral, Q4H PRN  ( x2  11/6 thus far0      ED Triage Vitals   Temperature Pulse Respirations Blood Pressure SpO2 Pain Score   11/05/24 1647 11/05/24 1647 11/05/24 1647 11/05/24 1647 11/05/24 1647 11/05/24 1659   98.2 °F (36.8 °C) 81 18 157/68 100 % 8     Weight (last 2 days)       Date/Time Weight    11/05/24 2049 69.3 (152.78)    11/05/24 1647 74.2 (163.58)            Vital Signs (last 3 days)       Date/Time Temp Pulse Resp BP MAP (mmHg) SpO2 O2 Device Patient Position - Orthostatic VS Pain    11/06/24 1036 -- -- -- 104/51 74 -- -- Sitting --    11/06/24 0902 -- -- -- -- -- -- None (Room air) -- 8    11/06/24 0725 98.7 °F (37.1 °C) 70 18 92/43 61 98 % None (Room air) Lying --    11/06/24 0602 -- -- -- -- -- -- -- -- 5 11/06/24 0249 -- -- -- -- -- -- -- -- 6 11/05/24 2320 98.4 °F (36.9 °C) 77 18 122/59 85 97 % None (Room air) Lying --    11/05/24 2257 -- -- -- -- -- -- -- -- 6 11/05/24 2124 -- -- -- -- -- -- -- -- 6 11/05/24 2053 98.6 °F (37 °C) 84 18 131/60 -- 95 % None (Room air) Lying --    11/05/24 2050 -- -- -- -- -- -- -- -- 6 11/05/24 1852 -- -- -- -- -- -- -- -- 9 11/05/24  1820 -- -- -- -- -- -- -- -- 8    11/05/24 1750 -- -- -- -- -- -- -- -- 8    11/05/24 1659 -- -- -- -- -- -- -- -- 8    11/05/24 1647 98.2 °F (36.8 °C) 81 18 157/68 -- 100 % None (Room air) Lying --              Pertinent Labs/Diagnostic Test Results:   Radiology:  XR humerus LEFT   ED Interpretation by Leonor Giordano DO (11/05 1813)   Mid shaft page-prosthetic fracture.  Orthopedic hardware in place    Image independently interpreted by myself       Final Interpretation by Mk Ortega MD (11/06 0700)      Periprosthetic fracture adjacent to the distal aspect of the humeral stem of the total shoulder arthroplasty.         Computerized Assisted Algorithm (CAA) may have been used to analyze all applicable images.         Workstation performed: [id number]         XR shoulder 2+ views LEFT   ED Interpretation by Leonor Giordano DO (11/05 1813)   Mid shaft humeral page-prosthetic fracture    Image independently interpreted by myself       Final Interpretation by Mk Ortega MD (11/06 0700)      Periprosthetic fracture adjacent to the distal aspect of the humeral stem of the total shoulder arthroplasty.         Computerized Assisted Algorithm (CAA) may have been used to analyze all applicable images.         Workstation performed: [id number]         XR elbow 3+ views LEFT   ED Interpretation by Leonor Giordano DO (11/05 1813)   No acute fracture or dislocation    Image independently interpreted by myself        Final Interpretation by Mk Ortega MD (11/06 0700)      Periprosthetic fracture adjacent to the distal aspect of the humeral stem of the total shoulder arthroplasty.         Computerized Assisted Algorithm (CAA) may have been used to analyze all applicable images.         Workstation performed: [id number]                 Results from last 7 days   Lab Units 11/05/24  2129   WBC Thousand/uL 11.05*   HEMOGLOBIN g/dL 12.4   HEMATOCRIT % 37.8   PLATELETS Thousands/uL 212         Results  from last 7 days   Lab Units 11/05/24  2129   SODIUM mmol/L 135   POTASSIUM mmol/L 4.1   CHLORIDE mmol/L 103   CO2 mmol/L 26   ANION GAP mmol/L 6   BUN mg/dL 12   CREATININE mg/dL 0.81   EGFR ml/min/1.73sq m 78   CALCIUM mg/dL 8.8   MAGNESIUM mg/dL 1.9     Results from last 7 days   Lab Units 11/05/24  2129   AST U/L 19   ALT U/L 14   ALK PHOS U/L 79   TOTAL PROTEIN g/dL 6.8   ALBUMIN g/dL 3.9   TOTAL BILIRUBIN mg/dL 0.50         Results from last 7 days   Lab Units 11/05/24  2129   GLUCOSE RANDOM mg/dL 117         Present on Admission:   Rheumatoid arthritis involving multiple sites with positive rheumatoid factor (HCC)      Admitting Diagnosis: Closed fracture of shaft of left humerus [S42.302A]  Fall in elderly patient [R29.6]  S/P reverse total shoulder arthroplasty, left [Z96.612]  Age/Sex: 64 y.o. female    Network Utilization Review Department  ATTENTION: Please call with any questions or concerns to [phone] and carefully listen to the prompts so that you are directed to the right person. All voicemails are confidential.   For Discharge needs, contact Care Management DC Support Team at [phone] opt. [phone]  Send all requests for admission clinical reviews, approved or denied determinations and any other requests to dedicated fax number below belonging to the campus where the patient is receiving treatment. List of dedicated fax numbers for the Facilities:  FACILITY NAME UR FAX NUMBER   ADMISSION DENIALS (Administrative/Medical Necessity) [phone]   DISCHARGE SUPPORT TEAM (NETWORK) [phone]   PARENT CHILD HEALTH (Maternity/NICU/Pediatrics) [phone]   [facility] [phone]   [facility] [phone]   [facility] [phone]   [facility] [phone]   [facility] [phone]   [facility] [phone]   [facility]  [phone]   [facility] [phone]   [facility] [phone]   Critical access hospital [phone]   [facility] [phone]   [facility] [phone]

## 2024-11-06 NOTE — OCCUPATIONAL THERAPY NOTE
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)     now resolved    Neurogenic bladder     Rheumatoid arthritis (HCC)     Septic arthritis of shoulder, left (HCC)     MSSA-cervical spine, lumbar spine    UTI (urinary tract infection)      Past Surgical History  Past Surgical History:   Procedure Laterality Date    CERVICAL FUSION      FL GUIDED NEEDLE PLAC BX/ASP/INJ  1/16/2021    FOOT SURGERY Bilateral     HERNIA REPAIR      as child    IN ARTHROPLASTY GLENOHUMERAL JOINT TOTAL SHOULDER Left 12/20/2023    Procedure: REVERSE TOTAL SHOULDER REPLACEMENT;  Surgeon: Damon Frances MD;  Location: [facility];  Service: Orthopedics           11/06/24 0900   OT Last Visit   OT Visit Date 11/06/24   Note Type   Note type Evaluation   Pain Assessment   Pain Assessment Tool 0-10   Pain Score 8   Pain Location/Orientation Orientation: Left;Location: Arm   Hospital Pain Intervention(s) Repositioned;Ambulation/increased activity;Emotional support;Rest   Multiple Pain Sites No   Restrictions/Precautions   Weight Bearing Precautions Per Order Yes   LUE Weight Bearing Per Order NWB   Other Precautions WBS;Fall Risk;Pain   Home Living   Type of Home House   Home Layout Two level;Bed/bath upstairs;Able to live on main level with bedroom/bathroom;Ramped entrance   Bathroom Shower/Tub Walk-in shower   Bathroom Toilet Raised   Bathroom Equipment Shower chair;Grab bars in  shower;Commode   Bathroom Accessibility Accessible   Home Equipment Walker;Wheelchair-manual;Cane   Additional Comments Pt lives with spouse in a two level house with ramped entrance and FOS to 2nd floor bedroom. Pt can stay on 1st floor if needed with full bath. Spouse works.   Prior Function   Level of [place] Independent with ADLs;Independent with functional mobility;Independent with IADLS   Lives With Spouse   IADLs Independent with driving;Independent with meal prep;Independent with medication management   Falls in the last 6 months 1 to 4   Vocational Full time employment   Comments At baseline, pt was I w/ ADLs, IADLs, and functional transfers/mobility w/ use of SPC PRN. (+) . (+) falls PTA.   Lifestyle   Autonomy At baseline, pt was I w/ ADLs, IADLs, and functional transfers/mobility w/ use of SPC PRN. (+) . (+) falls PTA.   Reciprocal Relationships Spouse   Service to Others FT employed-    ADL   Where Assessed Chair   Eating Assistance 7  Independent   Grooming Assistance 5  Supervision/Setup   UB Bathing Assistance 4  Minimal Assistance   LB Bathing Assistance 5  Supervision/Setup   UB Dressing Assistance 4  Minimal Assistance   LB Dressing Assistance 5  Supervision/Setup   Toileting Assistance  5  Supervision/Setup   Additional Comments Educated pt on one-handed dressing techniques- pt reports good understanding 2* hx of L reverse total shoulder replacement in 12/2023   Bed Mobility   Supine to Sit 5  Supervision   Additional items HOB elevated;Increased time required;Bedrails   Sit to Supine 5  Supervision   Additional items Increased time required   Additional Comments Pt lying supine at end of session. Call bell and phone within reach. All needs met and pt reports no further questions for OT at this time.   Transfers   Sit to Stand 5  Supervision   Additional items Increased time required   Stand to Sit 5  Supervision   Additional items Increased time required    Functional Mobility   Functional Mobility 5  Supervision   Additional items SPC   Balance   Static Sitting Fair +   Dynamic Sitting Fair   Static Standing Fair   Dynamic Standing Fair -   Ambulatory Fair -   Activity Tolerance   Activity Tolerance Patient tolerated treatment well   Medical Staff Made Aware Erica PT   Nurse Made Aware yes   RUE Assessment   RUE Assessment WFL  (4/5 throughout)   LUE Assessment   LUE Assessment X  (not tested 2* closed fracture of shaft of L humerus)   Hand Function   Gross Motor Coordination Functional   Fine Motor Coordination Functional   Sensation   Light Touch Partial deficits in the LUE   Proprioception   Proprioception No apparent deficits   Vision - Complex Assessment   Acuity Able to read clock/calendar on wall without difficulty;Able to read employee name badge without difficulty   Psychosocial   Psychosocial (WDL) WDL   Perception   Inattention/Neglect Appears intact   Cognition   Overall Cognitive Status WFL   Arousal/Participation Alert;Cooperative   Attention Within functional limits   Orientation Level Oriented X4   Memory Within functional limits   Following Commands Follows all commands and directions without difficulty   Assessment   Limitation Decreased ADL status;Decreased UE strength;Decreased endurance;Decreased high-level ADLs;Decreased self-care trans;Decreased sensation   Prognosis Good   Assessment Pt is a 64 y.o. female seen for OT evaluation s/p adm to [facility] on 11/5/2024 s/p fall and c/o L UE pain and admitted w/ Closed fracture of shaft of left humerus . Orthopedics was consulted in the emergency room and recommended outpatient follow-up. NWB L UE in immobilizing cast. 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 Pt with active OT orders and activity orders for Up and OOB as tolerated.  Pt lives with spouse in a two level house with ramped entrance and FOS to 2nd floor bedroom. Pt can stay on 1st floor if needed with full bath. Spouse works. At baseline, pt was I w/ ADLs, IADLs, and functional transfers/mobility w/ use of SPC PRN. (+) . (+) falls PTA. Upon evaluation, pt currently requires Min A for UB ADLs, Supervision for LB ADLs, Supervision for toileting, Supervision for bed mobility, and Supervision for functional mobility/transfers 2* the following deficits impacting occupational performance: decreased strength , decreased balance, decreased activity tolerance, limited functional reach, impaired sensation, and increased pain. These impairments, as well at pt’s personal factors of: steps within home environment, difficulty performing ADLs, difficulty performing IADLs, difficulty performing transfers/mobility, WBS, fall risk , functional decline , and increased reliance on DME  limit pt’s ability to safely engage in all baseline areas of occupation. Pt to continue to benefit from continued acute OT services during hospital stay to address defined deficits and to maximize level of functional independence in the following Occupational Performance areas: grooming, bathing/shower, toilet hygiene, dressing, medication management, health maintenance, functional mobility, community mobility, clothing management, cleaning, meal prep, and household maintenance. The patient's raw score on the -PAC Daily Activity Inpatient Short Form is 19. A raw score of greater than or equal to 19 suggests the patient may benefit from discharge to home. Please refer to the recommendation of the Occupational Therapist for safe discharge planning. OT will continue to follow pt 1-2x/wk to address the following goals to  w/in 10-14 days:   Goals   Patient Goals To go home   LTG Time Frame 10-14   Long Term Goal Please refer to LTGs listed below   Plan   Treatment Interventions ADL retraining;Functional  transfer training;UE strengthening/ROM;Endurance training;Patient/family training;Equipment evaluation/education;Compensatory technique education;Continued evaluation;Activityengagement   Goal Expiration Date 11/20/24   OT Treatment Day 0   OT Frequency 1-2x/wk   Discharge Recommendation   Rehab Resource Intensity Level, OT No post-acute rehabilitation needs   AM-PAC Daily Activity Inpatient   Lower Body Dressing 3   Bathing 3   Toileting 3   Upper Body Dressing 3   Grooming 3   Eating 4   Daily Activity Raw Score 19   Daily Activity Standardized Score (Calc for Raw Score >=11) 40.22   AM-PAC Applied Cognition Inpatient   Following a Speech/Presentation 4   Understanding Ordinary Conversation 4   Taking Medications 4   Remembering Where Things Are Placed or Put Away 4   Remembering List of 4-5 Errands 4   Taking Care of Complicated Tasks 4   Applied Cognition Raw Score 24   Applied Cognition Standardized Score 62.21       GOALS    Pt will improve activity tolerance to G for min 30 min txment sessions for increase engagement in functional tasks    Pt will complete bed mobility at a Mod I level w/ G balance/safety demonstrated to decrease caregiver assistance required     Pt will complete UB dressing/self care w/ mod I using adaptive device, one-handed dressing techniques, and DME as needed     Pt will complete LB dressing/self care w/ mod I using adaptive device and DME as needed    Pt will complete toileting w/ mod I w/ G hygiene/thoroughness using DME as needed    Pt will improve functional transfers to Mod I on/off all surfaces using DME as needed w/ G balance/safety     Pt will improve functional mobility during ADL/IADL/leisure tasks to Mod I using DME as needed w/ G balance/safety     Pt will be attentive 100% of the time during ongoing cognitive assessment w/ G participation to assist w/ safe d/c planning/recommendations    Pt will demonstrate G carryover of pt/caregiver education and training as appropriate  w/o cues w/ good tolerance to increase safety during functional tasks    Pt will verbalize 3 potential fall hazards and identify appropriate compensatory techniques to decrease fall risk in home environment     Pt will increase standing tolerance to 5-8 mins with Fair+ dynamic standing balance to increase safety during participation in ADLs     Pt will demonstrate 100% adherence to WBS during all functional activities w/o cues from therapist       Steffi Cisneros, OTR/L

## 2024-11-06 NOTE — ASSESSMENT & PLAN NOTE
Patient is 64-year-old female with past medical history of rheumatoid arthritis, osteoporosis, and osteoarthritis who presents to the hospital after mechanical fall and left arm pain.  Imaging in the emergency room shows fracture of left humerus midshaft the distal end of surgical hardware.  She had a left shoulder reconstruction in December 2023 with Dr. Damon Frances.  Orthopedics was consulted in the emergency room and recommended outpatient follow-up, patient's pain was uncontrolled so she will be admitted for pain control.  Follow-up outpatient with orthopedics  Maintain arm in immobilizing cast  Nonweightbearing left arm  Pain regimen:  Tylenol 975 mg every 8 hours  Flexeril 10 mg every 8 hours  Ibuprofen 600 mg every 8 hours, between doses of Tylenol while awake (1000/1800)  Oxycodone 2.5 mg every 4 hours as needed for moderate pain  Oxycodone 5 mg every 4 hours as needed for severe pain  Dilaudid 0.2 mg IV every 4 hours for breakthrough  Bowel regimen:  Senna/Colace 2 tablets at bedtime  MiraLAX 17 g daily

## 2024-11-06 NOTE — ASSESSMENT & PLAN NOTE
Plan as above  Follow-up outpatient with orthopedics  Continue outpatient medication regimen  Weight training/weight bearing exercise recommended

## 2024-11-06 NOTE — ASSESSMENT & PLAN NOTE
Per outpatient records, patient has a history of previous spinal cord injury with ambulatory dysfunction  Outpatient records also site neurogenic bowel  Continue supportive care

## 2024-11-06 NOTE — PLAN OF CARE
Problem: PAIN - ADULT  Goal: Verbalizes/displays adequate comfort level or baseline comfort level  Description: Interventions:  - Encourage patient to monitor pain and request assistance  - Assess pain using appropriate pain scale  - Administer analgesics based on type and severity of pain and evaluate response  - Implement non-pharmacological measures as appropriate and evaluate response  - Consider cultural and social influences on pain and pain management  - Notify physician/advanced practitioner if interventions unsuccessful or patient reports new pain  Outcome: Progressing     Problem: SAFETY ADULT  Goal: Patient will remain free of falls  Description: INTERVENTIONS:  - Educate patient/family on patient safety including physical limitations  - Instruct patient to call for assistance with activity   - Consult OT/PT to assist with strengthening/mobility   - Keep Call bell within reach  - Keep bed low and locked with side rails adjusted as appropriate  - Keep care items and personal belongings within reach  - Initiate and maintain comfort rounds  - Make Fall Risk Sign visible to staff  - Offer Toileting every 2 Hours, in advance of need  - Initiate/Maintain bed alarm  - Apply yellow socks and bracelet for high fall risk patients  - Consider moving patient to room near nurses station  Outcome: Progressing     Problem: DISCHARGE PLANNING  Goal: Discharge to home or other facility with appropriate resources  Description: INTERVENTIONS:  - Identify barriers to discharge w/patient and caregiver  - Arrange for needed discharge resources and transportation as appropriate  - Identify discharge learning needs (meds, wound care, etc.)  - Arrange for interpretive services to assist at discharge as needed  - Refer to Case Management Department for coordinating discharge planning if the patient needs post-hospital services based on physician/advanced practitioner order or complex needs related to functional status, cognitive  ability, or social support system  Outcome: Progressing     Problem: Knowledge Deficit  Goal: Patient/family/caregiver demonstrates understanding of disease process, treatment plan, medications, and discharge instructions  Description: Complete learning assessment and assess knowledge base.  Interventions:  - Provide teaching at level of understanding  - Provide teaching via preferred learning methods  Outcome: Progressing

## 2024-11-06 NOTE — PHYSICAL THERAPY NOTE
PHYSICAL THERAPY EVALUATION          Patient Name: Mary Lopez  Today's Date: 11/6/2024 11/06/24 0859   PT Last Visit   PT Visit Date 11/06/24   Note Type   Note type Evaluation   Pain Assessment   Pain Assessment Tool 0-10   Pain Score 8   Pain Location/Orientation Orientation: Left;Location: Arm   Hospital Pain Intervention(s) Emotional support   Restrictions/Precautions   Weight Bearing Precautions Per Order Yes   LUE Weight Bearing Per Order NWB   Other Precautions WBS;Fall Risk;Pain   Home Living   Type of Home House   Home Layout Two level;Bed/bath upstairs;Able to live on main level with bedroom/bathroom;Ramped entrance   Bathroom Shower/Tub Walk-in shower   Bathroom Toilet Raised   Bathroom Equipment Shower chair;Grab bars in shower;Commode   Home Equipment Walker;Wheelchair-manual;Cane   Additional Comments ramped entrance. full bathroom on first fl, able to have first fl set up.   Prior Function   Level of [place] Independent with ADLs;Independent with functional mobility;Independent with IADLS   Lives With Spouse   IADLs Independent with driving;Independent with meal prep;Independent with medication management   Falls in the last 6 months 1 to 4   Vocational Full time employment   Comments indep w use of cane prn. spouse also works   General   Additional Pertinent History pt admitted 11/5/24 for closed fx of L humerus. NWB LUE per ortho consult. up and oob orders   Cognition   Overall Cognitive Status WFL   Arousal/Participation Cooperative   Memory Within functional limits   Following Commands Follows all commands and directions without difficulty   RLE Assessment   RLE Assessment   (gets botox injections in her calves dt prev spinal injury/surgery)   LLE Assessment   LLE Assessment   (gets botox injections in her calves dt prev spinal injury/surgery)   Bed Mobility   Supine to Sit 5  Supervision   Additional items HOB elevated;Bedrails;Increased time required   Sit to Supine 5   Supervision   Additional items Increased time required;Bedrails   Transfers   Sit to Stand 5  Supervision   Additional items Increased time required;Other  (AD)   Stand to Sit 5  Supervision   Additional items Other;Increased time required  (AD)   Ambulation/Elevation   Gait pattern Short stride;Excessively slow   Gait Assistance 5  Supervision   Additional items Assist x 1   Assistive Device Straight cane   Distance about 300'   Stair Management Assistance Not tested   Ambulation/Elevation Additional Comments pt decline stair trial at this time   Balance   Static Standing Fair   Dynamic Standing Fair -   Ambulatory Fair -   Endurance Deficit   Endurance Deficit No   Activity Tolerance   Activity Tolerance Patient tolerated treatment well   Medical Staff Made Aware Steffi VARGAS; MD   Nurse Made Aware yes   Assessment   Prognosis Good   Assessment Mary Lopez is a 64 y.o. female admitted to [facility] on 11/5/2024 for Closed fracture of shaft of left humerus. PT was consulted and pt was seen on 11/6/2024 for mobility assessment and d/c planning. Pt presents w low fall risk, NWB LUE, acute pain. At baseline is indep w use of cane prn. Pt is currently functioning at a S for bed mobility, transfers and ambulation. Pt demonstrated ability to ambulate community distances. Decline stair trial however has first fl set up w ramped entrance. Reports assist from spouse if needed. Denies concerns w dc home. Pt will benefit from continued mobilization via nsg/restorative. The patient's AM-PAC Basic Mobility Inpatient Short Form Raw Score is 18. A Raw score of greater than 16 suggests the patient may benefit from discharge to home.   Barriers to Discharge None   Plan   PT Frequency   (d/c PT: maintain on restorative)   Discharge Recommendation   Rehab Resource Intensity Level, PT No post-acute rehabilitation needs  (can consider OPPT when cleared by ortho)   AM-PAC Basic Mobility Inpatient   Turning in Flat Bed Without Bedrails 3   Lying  on Back to Sitting on Edge of Flat Bed Without Bedrails 3   Moving Bed to Chair 3   Standing Up From Chair Using Arms 3   Walk in Room 3   Climb 3-5 Stairs With Railing 3   Basic Mobility Inpatient Raw Score 18   Basic Mobility Standardized Score 41.05   [facility] Highest Level Of Mobility   -HL Goal 6: Walk 10 steps or more   -HLM Achieved 8: Walk 250 feet ot more   End of Consult   Patient Position at End of Consult Supine;All needs within reach   History: co - morbidities including age, use of assistive device prn, current experience including fall risk, wbs  Exam: impairments in systems including multiple body structures involved; neuromuscular (balance, gait, transfers), joint integrity, activity limitations  (difficulties executing an action); participation restrictions (problems associated w involvement in life situations), am-pac  Clinical: stable/unpredictable  Complexity: moderate    Erica Holland, PT

## 2024-11-06 NOTE — ASSESSMENT & PLAN NOTE
Patient follows with [facility] podiatry for bilateral foot pain, bilateral hammertoe, and right hallux abductovalgus  Patient was treated with supportive care

## 2024-11-06 NOTE — PLAN OF CARE
Problem: OCCUPATIONAL THERAPY ADULT  Goal: Performs self-care activities at highest level of function for planned discharge setting.  See evaluation for individualized goals.  Description: Treatment Interventions: ADL retraining, Functional transfer training, UE strengthening/ROM, Endurance training, Patient/family training, Equipment evaluation/education, Compensatory technique education, Continued evaluation, Activityengagement          See flowsheet documentation for full assessment, interventions and recommendations.   Note: Limitation: Decreased ADL status, Decreased UE strength, Decreased endurance, Decreased high-level ADLs, Decreased self-care trans, Decreased sensation  Prognosis: Good  Assessment: Pt is a 64 y.o. female seen for OT evaluation s/p adm to [facility] on 11/5/2024 s/p fall and c/o L UE pain and admitted w/ Closed fracture of shaft of left humerus . Orthopedics was consulted in the emergency room and recommended outpatient follow-up. NWB L UE in immobilizing cast. 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 Pt with active OT orders and activity orders for Up and OOB as tolerated. Pt lives with spouse in a two level house with ramped entrance and FOS to 2nd floor bedroom. Pt can stay on 1st floor if needed with full bath. Spouse works. At baseline, pt was I w/ ADLs, IADLs, and functional transfers/mobility w/ use of SPC PRN. (+) . (+) falls PTA. Upon evaluation, pt currently requires Min A for UB ADLs, Supervision for LB ADLs, Supervision for toileting, Supervision for bed mobility, and Supervision for functional mobility/transfers 2* the following deficits impacting occupational performance: decreased strength , decreased balance, decreased activity tolerance, limited functional reach, impaired sensation, and increased pain.  These impairments, as well at pt’s personal factors of: steps within home environment, difficulty performing ADLs, difficulty performing IADLs, difficulty performing transfers/mobility, WBS, fall risk , functional decline , and increased reliance on DME  limit pt’s ability to safely engage in all baseline areas of occupation. Pt to continue to benefit from continued acute OT services during hospital stay to address defined deficits and to maximize level of functional independence in the following Occupational Performance areas: grooming, bathing/shower, toilet hygiene, dressing, medication management, health maintenance, functional mobility, community mobility, clothing management, cleaning, meal prep, and household maintenance. The patient's raw score on the -PAC Daily Activity Inpatient Short Form is 19. A raw score of greater than or equal to 19 suggests the patient may benefit from discharge to home. Please refer to the recommendation of the Occupational Therapist for safe discharge planning. OT will continue to follow pt 1-2x/wk to address the following goals to  w/in 10-14 days:     Rehab Resource Intensity Level, OT: No post-acute rehabilitation needs

## 2024-11-06 NOTE — H&P
H&P - Hospitalist   Name: Mary Lopez 64 y.o. female I MRN: [id number]  Unit/Bed#: E2 -01 I Date of Admission: 11/5/2024   Date of Service: 11/5/2024 I Hospital Day: 0     Assessment & Plan  Closed fracture of shaft of left humerus  Patient is 64-year-old female with past medical history of rheumatoid arthritis, osteoporosis, and osteoarthritis who presents to the hospital after mechanical fall and left arm pain.  Imaging in the emergency room shows fracture of left humerus midshaft the distal end of surgical hardware.  She had a left shoulder reconstruction in December 2023 with Dr. Damon Frances.  Orthopedics was consulted in the emergency room and recommended outpatient follow-up, patient's pain was uncontrolled so she will be admitted for pain control.  Follow-up outpatient with orthopedics  Maintain arm in immobilizing cast  Nonweightbearing left arm  Pain regimen:  Tylenol 975 mg every 8 hours  Flexeril 10 mg every 8 hours  Ibuprofen 600 mg every 8 hours, between doses of Tylenol while awake (1000/1800)  Oxycodone 2.5 mg every 4 hours as needed for moderate pain  Oxycodone 5 mg every 4 hours as needed for severe pain  Dilaudid 0.2 mg IV every 4 hours for breakthrough  Bowel regimen:  Senna/Colace 2 tablets at bedtime  MiraLAX 17 g daily  Rheumatoid arthritis involving multiple sites with positive rheumatoid factor (HCC)  Continue home medications in outpatient setting  Age-related osteoporosis with current pathological fracture  Plan as above  Follow-up outpatient with orthopedics  Continue outpatient medication regimen  Weight training/weight bearing exercise recommended      VTE Pharmacologic Prophylaxis: VTE Score: 5 High Risk (Score >/= 5) - Pharmacological DVT Prophylaxis Ordered: heparin. Sequential Compression Devices Ordered.  Code Status: Level 1 - Full Code   Discussion with patient and  at bedside    Anticipated Length of Stay: Patient will be admitted on an inpatient basis with an  anticipated length of stay of greater than 2 midnights secondary to treatment for acute pain.    History of Present Illness   Chief Complaint: Fall, left shoulder pain    Mary Lopez is 64-year-old female with past medical history of rheumatoid arthritis, osteoporosis, and osteoarthritis who presents to the hospital after mechanical fall and left arm pain.  Imaging in the emergency room shows fracture of left humerus midshaft the distal end of surgical hardware.  She had a left shoulder reconstruction in December 2023 with Dr. Damon Frances.  Orthopedics was consulted in the emergency room and recommended outpatient follow-up, patient's pain was uncontrolled so she will be admitted for pain control.     Review of Systems   Constitutional:  Negative for chills and fever.   HENT:  Negative for ear pain and sore throat.    Eyes:  Negative for pain and visual disturbance.   Respiratory:  Negative for cough and shortness of breath.    Cardiovascular:  Negative for chest pain and palpitations.   Gastrointestinal:  Negative for abdominal pain and vomiting.   Genitourinary:  Negative for dysuria and hematuria.   Musculoskeletal:  Positive for arthralgias and myalgias. Negative for back pain.   Skin:  Negative for color change and rash.   Neurological:  Negative for seizures and syncope.   All other systems reviewed and are negative.      Historical Information   Past Medical History:   Diagnosis Date    Anemia     Anxiety     Arthritis     Coronary artery disease     Muscle weakness     left side    Myocarditis associated with COVID-19 vaccination (HCC)     now resolved    Neurogenic bladder     Rheumatoid arthritis (HCC)     Septic arthritis of shoulder, left (HCC)     MSSA-cervical spine, lumbar spine    UTI (urinary tract infection)      Past Surgical History:   Procedure Laterality Date    CERVICAL FUSION      FL GUIDED NEEDLE PLAC BX/ASP/INJ  1/16/2021    FOOT SURGERY Bilateral     HERNIA REPAIR      as child    CO  ARTHROPLASTY GLENOHUMERAL JOINT TOTAL SHOULDER Left 12/20/2023    Procedure: REVERSE TOTAL SHOULDER REPLACEMENT;  Surgeon: Damon Frances MD;  Location: [facility];  Service: Orthopedics     Social History     Tobacco Use    Smoking status: Never     Passive exposure: Never    Smokeless tobacco: Never   Vaping Use    Vaping status: Never Used   Substance and Sexual Activity    Alcohol use: Yes     Comment: socially     Drug use: Never    Sexual activity: Not Currently     E-Cigarette/Vaping    E-Cigarette Use Never User      E-Cigarette/Vaping Substances    Nicotine No     THC No     CBD No     Flavoring No     Other No     Unknown No      History reviewed. No pertinent family history.  Social History:  Marital Status: /Civil Union   Occupation: Works in an office  Patient Pre-hospital Living Situation: Home, With spouse  Patient Pre-hospital Level of Mobility: walks with cane  Patient Pre-hospital Diet Restrictions: None    Meds/Allergies   I have reviewed home medications with patient personally.  Prior to Admission medications    Medication Sig Start Date End Date Taking? Authorizing Provider   acetaminophen (TYLENOL) 325 mg tablet Take 2 tablets (650 mg total) by mouth every 6 (six) hours as needed for mild pain 12/21/23   Sheron Driscoll PA-C   Cholecalciferol (Vitamin D-3) 125 MCG (5000 UT) TABS Take by mouth in the morning    Historical Provider, MD   cycloSPORINE (RESTASIS) 0.05 % ophthalmic emulsion Apply 1 drop to eye 2 (two) times a day 9/22/23   Historical Provider, MD   Enbrel 50 MG/ML SOSY Inject 50 mg under the skin 10/27/23   Historical Provider, MD   folic acid (FOLVITE) 1 mg tablet Take by mouth daily    Historical Provider, MD   methotrexate 2.5 mg tablet Take 15 mg by mouth 10/27/23   Historical Provider, MD   ONABOTULINUMTOXINA IJ Inject as directed every 3 (three) months.    Historical Provider, MD   oxybutynin (DITROPAN-XL) 10 MG 24 hr tablet Take 10 mg by mouth if needed (for long trips)  10/30/23 10/29/24  Historical Provider, MD   Premarin vaginal cream Insert into the vagina 10/30/23 10/29/24  Historical Provider, MD     Allergies   Allergen Reactions    Tramadol Other (See Comments)     Hard to wake up    Covid-19 (Subunit) Vaccine Other (See Comments)     myocarditis    Ethylenediamine Itching     Other reaction(s): ETHYLENEDIAMINE (Hives and edema)    Benadryl [Diphenhydramine] Palpitations    Other Rash     Topical cream        Objective :  Temp:  [98.2 °F (36.8 °C)-98.6 °F (37 °C)] 98.6 °F (37 °C)  HR:  [81-84] 84  BP: (131-157)/(60-68) 131/60  Resp:  [18] 18  SpO2:  [95 %-100 %] 95 %  O2 Device: None (Room air)    Physical Exam  Vitals and nursing note reviewed.   Constitutional:       General: She is not in acute distress.     Appearance: She is well-developed.   HENT:      Head: Normocephalic and atraumatic.   Eyes:      Conjunctiva/sclera: Conjunctivae normal.   Cardiovascular:      Rate and Rhythm: Normal rate and regular rhythm.      Heart sounds: No murmur heard.  Pulmonary:      Effort: Pulmonary effort is normal. No respiratory distress.      Breath sounds: Normal breath sounds.   Abdominal:      Palpations: Abdomen is soft.      Tenderness: There is no abdominal tenderness.   Musculoskeletal:         General: Tenderness present. No swelling.      Cervical back: Neck supple.      Comments: Left upper extremity pain and limited range of motion due to immobilizing cast   Skin:     General: Skin is warm and dry.      Capillary Refill: Capillary refill takes less than 2 seconds.   Neurological:      Mental Status: She is alert.   Psychiatric:         Mood and Affect: Mood normal.          Lines/Drains:            Lab Results: I have reviewed the following results:                  Lab Results   Component Value Date    HGBA1C 5.4 09/16/2021    HGBA1C 6.1 (H) 01/17/2021           Imaging Results Review: I personally reviewed the following image studies in PACS and associated radiology  reports: chest xray. My interpretation of the radiology images/reports is: Acute fracture of midshaft left humerus at the distal end of surgical hardware.  Other Study Results Review: No additional pertinent studies reviewed.    ** Please Note: This note has been constructed using a voice recognition system. **

## 2024-11-06 NOTE — ASSESSMENT & PLAN NOTE
Patient is 64-year-old female with past medical history of rheumatoid arthritis, osteoporosis, and osteoarthritis who presents to the hospital after mechanical fall and left arm pain.      Imaging in the emergency room shows fracture of left humerus midshaft the distal end of surgical hardware.    She had a left shoulder reconstruction in December 2023 with Dr. Damon Frances.  On-call orthopedic surgeon was contacted by the ER on admission: They recommend placing coap with posterior long-arm splint and outpatient orthopedic surgery follow-up  patient's pain was uncontrolled so she was admitted for pain control and PT/OT eval.  Maintain arm in immobilizing cast, and Nonweightbearing left arm  Pt was placed on pain regimen and bowel regimen:  she notes her pain is controlled on current:    I personally called Dr. Sandoval office to coordinate care:  confirmed pt has appt scheduled for tomorrow.  She will be dc home on tylenol 975mg q8h scheduled, oxy 2.5mg q4h prn moderate pain and 5mg q4h prn severe pain.  Ibuprofen to be used sparingly 600mg once daily prn, and flexeril used sparingly once daily prn.  She worked with PT/OT prior to discharged and was approved for dc home  Confirmed personally with patient that the mechanism for her fall was mechanical.  She denied any preceding symptoms such as dizziness, lightheadedness, syncope, cardiopulmonary distress.  She tripped and fell resulting in the fracture.  Patient be discharged home with outpatient follow

## 2024-11-06 NOTE — ASSESSMENT & PLAN NOTE
Follow-up outpatient with orthopedics  Continue outpatient medication regimen  Weight training/weight bearing exercise recommended   Admission

## 2024-11-06 NOTE — ASSESSMENT & PLAN NOTE
Continue home medications in outpatient setting: She is on Enbrel and methotrexate  Continue outpatient rheumatology follow-up

## 2024-11-06 NOTE — DISCHARGE SUMMARY
Discharge Summary - Hospitalist   Name: Mary Lopez 64 y.o. female I MRN: [id number]  Unit/Bed#: E2 -01 I Date of Admission: 11/5/2024   Date of Service: 11/6/2024 I Hospital Day: 1       Discharging Physician / Practitioner: Haydee Carlos MD  PCP: Carl Fermin DO  Admission Date:   Admission Orders (From admission, onward)       Ordered        11/05/24 1948  INPATIENT ADMISSION  Once                          Discharge Date: 11/06/24  Assessment & Plan  Closed fracture of shaft of left humerus  Patient is 64-year-old female with past medical history of rheumatoid arthritis, osteoporosis, and osteoarthritis who presents to the hospital after mechanical fall and left arm pain.      Imaging in the emergency room shows fracture of left humerus midshaft the distal end of surgical hardware.    She had a left shoulder reconstruction in December 2023 with Dr. Damon Frances.  On-call orthopedic surgeon was contacted by the ER on admission: They recommend placing coap with posterior long-arm splint and outpatient orthopedic surgery follow-up  patient's pain was uncontrolled so she was admitted for pain control and PT/OT eval.  Maintain arm in immobilizing cast, and Nonweightbearing left arm  Pt was placed on pain regimen and bowel regimen:  she notes her pain is controlled on current:    I personally called Dr. Sandoval office to coordinate care:  confirmed pt has appt scheduled for tomorrow.  She will be dc home on tylenol 975mg q8h scheduled, oxy 2.5mg q4h prn moderate pain and 5mg q4h prn severe pain.  Ibuprofen to be used sparingly 600mg once daily prn, and flexeril used sparingly once daily prn.  She worked with PT/OT prior to discharged and was approved for dc home  Confirmed personally with patient that the mechanism for her fall was mechanical.  She denied any preceding symptoms such as dizziness, lightheadedness, syncope, cardiopulmonary distress.  She tripped and fell resulting in the fracture.  Patient be  discharged home with outpatient follow    Rheumatoid arthritis involving multiple sites with positive rheumatoid factor (HCC)  Continue home medications in outpatient setting: She is on Enbrel and methotrexate  Continue outpatient rheumatology follow-up  Age-related osteoporosis with current pathological fracture  Follow-up outpatient with orthopedics  Continue outpatient medication regimen  Weight training/weight bearing exercise recommended  History of spinal cord injury  Per outpatient records, patient has a history of previous spinal cord injury with ambulatory dysfunction  Outpatient records also site neurogenic bowel  Continue supportive care  Hallux abducto valgus  Patient follows with [facility] podiatry for bilateral foot pain, bilateral hammertoe, and right hallux abductovalgus  Patient was treated with supportive care     Medical Problems       Resolved Problems  Date Reviewed: 5/7/2024   None       Consultations During Hospital Stay:  none    Procedures Performed:   none    Significant Findings / Test Results:   Imaging  11/5: Xray Left humerus/elbow/shoulder:  Periprosthetic fracture adjacent to the distal aspect of the humeral stem of the total shoulder arthroplasty.     Incidental Findings:   none    Test Results Pending at Discharge (will require follow up):   none     Outpatient Tests Requested:  Appt tomorrow w Dr. Frances    Complications:  none    Reason for Admission: arm pain, s/p fall    Hospital Course:   Mary Lopez is a 64 y.o. female patient who originally presented to the hospital on 11/5/2024 due to arm pain after a fall.  She was hospitalized as described in details above.  Will be discharged home to continue outpatient follow-up      Please see above list of diagnoses and related plan for additional information.     Condition at Discharge: good    Discharge Day Visit / Exam:   Subjective: Patient relates when she is resting and not moving she does not have any pain in her arm.  She  "notes the pain occurs when she is moving it.  She relates the current pain medication regimen is controlling her pain.  She notes after her previous surgery she used oxycodone, only briefly and sparingly as she did not wish to be on this medication for any significant time.  She notes she is agreeable to continuing this in the short-term.  She feels it has improved her pain.  She denies any pain anywhere else.  She relates that she ambulated in the hallway with physical therapy.  She denied any dizziness or lightheadedness.  She denied any chest pain or shortness of breath.  She denies any abdominal pain, nausea, vomiting, diarrhea.  She notes she is taking p.o.  Patient lates in the past narcotics gave her constipation however she has bowel regimen with MiraLAX at home.  Patient states that the fall was due to her tripping.  She denied any preceding symptoms that caused the fall.  She denied any dizziness, lightheadedness, blacking out, or any other symptoms prior.  She notes she is able to wiggle her fingers without any new numbness or weakness.      Vitals: Blood Pressure: 104/51 (11/06/24 1036)  Pulse: 70 (11/06/24 0725)  Temperature: 98.7 °F (37.1 °C) (11/06/24 0725)  Temp Source: Oral (11/06/24 0725)  Respirations: 18 (11/06/24 0725)  Height: 5' 3\" (160 cm) (11/05/24 2049)  Weight - Scale: 69.3 kg (152 lb 12.5 oz) (11/05/24 2049)  SpO2: 98 % (11/06/24 0725)  Physical Exam   General: Very pleasant female.  No acute distress.  Nontachypneic and nondyspneic  Heart: Regular rate and rhythm.  S1-S2 present.  No murmur, rub, gallop  Lungs: Clear to auscultation bilaterally.  Good air movement.  No wheezes, crackles, rhonchi.  No accessory muscle use or respiratory distress  Abdomen: Soft, nontender, nondistended, normoactive bowel sounds present.  No guarding or rebound.  No peritoneal signs or mass  Extremities: Left upper extremity in immobilizer cast with Ace wrap.  Wiggles fingers.  + left radial pulse palpated " over ace wrap- present.  2+ bilateral pedal pulses  Neurologic: awake and alert.  Fluent speech.  Interactive.  No somnolence or lethargy:  wiggles all left fingers     Discussion with Family: updated  Missael Lopez    Discharge instructions/Information to patient and family:   See after visit summary for information provided to patient and family.      Provisions for Follow-Up Care:  See after visit summary for information related to follow-up care and any pertinent home health orders.      Mobility at time of Discharge:   Basic Mobility Inpatient Raw Score: 17  JH-HLM Goal: 5: Stand one or more mins  JH-HLM Achieved: 5: Stand (1 or more minutes)  HLM Goal achieved. Continue to encourage appropriate mobility.     Disposition:   Home    Planned Readmission: no    Discharge Medications:  See after visit summary for reconciled discharge medications provided to patient and/or family.      Administrative Statements   Discharge Statement:  I have spent a total time of 50 minutes in caring for this patient on the day of the visit/encounter. .    **Please Note: This note may have been constructed using a voice recognition system**

## 2024-11-07 NOTE — RESTORATIVE TECHNICIAN NOTE
Restorative Technician Note      Patient Name: Mary Lopez     Restorative Tech Visit Date: 11/07/24  Note Type: Mobility  Patient Position Upon Consult: Supine  Activity Performed: Ambulated; Range of motion  Assistive Device: Cane  Patient Position at End of Consult: All needs within reach; Supine

## 2024-11-20 ENCOUNTER — TELEPHONE (OUTPATIENT)
Age: 64
End: 2024-11-20

## 2024-11-20 NOTE — TELEPHONE ENCOUNTER
Caller: Mary Lopez    Doctor and/or Office: Dr. Pratt/Daniel    #: [phone]    Escalation: Care/Her old Rheumatologist retired and she needs a new one--can Dr. Pratt add a referral to Epic so she can get a new one? Whomever he thinks is best. Please call her back after this has been added to Epic. Thanks

## 2025-02-05 ENCOUNTER — OFFICE VISIT (OUTPATIENT)
Dept: RHEUMATOLOGY | Facility: CLINIC | Age: 65
End: 2025-02-05
Payer: COMMERCIAL

## 2025-02-05 ENCOUNTER — APPOINTMENT (OUTPATIENT)
Dept: LAB | Facility: CLINIC | Age: 65
End: 2025-02-05
Payer: COMMERCIAL

## 2025-02-05 VITALS
WEIGHT: 153.8 LBS | HEART RATE: 69 BPM | HEIGHT: 63 IN | DIASTOLIC BLOOD PRESSURE: 72 MMHG | BODY MASS INDEX: 27.25 KG/M2 | SYSTOLIC BLOOD PRESSURE: 120 MMHG | OXYGEN SATURATION: 98 % | TEMPERATURE: 97.9 F

## 2025-02-05 DIAGNOSIS — Z79.899 HIGH RISK MEDICATION USE: ICD-10-CM

## 2025-02-05 DIAGNOSIS — M80.00XG AGE-RELATED OSTEOPOROSIS WITH CURRENT PATHOLOGICAL FRACTURE WITH DELAYED HEALING, SUBSEQUENT ENCOUNTER: ICD-10-CM

## 2025-02-05 DIAGNOSIS — M05.79 RHEUMATOID ARTHRITIS INVOLVING MULTIPLE SITES WITH POSITIVE RHEUMATOID FACTOR (HCC): Primary | ICD-10-CM

## 2025-02-05 LAB
25(OH)D3 SERPL-MCNC: 38.9 NG/ML (ref 30–100)
ALBUMIN SERPL BCG-MCNC: 4.3 G/DL (ref 3.5–5)
ALP SERPL-CCNC: 102 U/L (ref 34–104)
ALT SERPL W P-5'-P-CCNC: 11 U/L (ref 7–52)
ANION GAP SERPL CALCULATED.3IONS-SCNC: 9 MMOL/L (ref 4–13)
AST SERPL W P-5'-P-CCNC: 19 U/L (ref 13–39)
BASOPHILS # BLD AUTO: 0.03 THOUSANDS/ΜL (ref 0–0.1)
BASOPHILS NFR BLD AUTO: 1 % (ref 0–1)
BILIRUB SERPL-MCNC: 0.54 MG/DL (ref 0.2–1)
BUN SERPL-MCNC: 14 MG/DL (ref 5–25)
CALCIUM SERPL-MCNC: 9.5 MG/DL (ref 8.4–10.2)
CHLORIDE SERPL-SCNC: 105 MMOL/L (ref 96–108)
CO2 SERPL-SCNC: 28 MMOL/L (ref 21–32)
CREAT SERPL-MCNC: 0.82 MG/DL (ref 0.6–1.3)
CRP SERPL QL: 4.8 MG/L
EOSINOPHIL # BLD AUTO: 0.07 THOUSAND/ΜL (ref 0–0.61)
EOSINOPHIL NFR BLD AUTO: 1 % (ref 0–6)
ERYTHROCYTE [DISTWIDTH] IN BLOOD BY AUTOMATED COUNT: 13.2 % (ref 11.6–15.1)
ERYTHROCYTE [SEDIMENTATION RATE] IN BLOOD: 27 MM/HOUR (ref 0–29)
GFR SERPL CREATININE-BSD FRML MDRD: 76 ML/MIN/1.73SQ M
GLUCOSE SERPL-MCNC: 93 MG/DL (ref 65–140)
HCT VFR BLD AUTO: 42.2 % (ref 34.8–46.1)
HGB BLD-MCNC: 14 G/DL (ref 11.5–15.4)
IMM GRANULOCYTES # BLD AUTO: 0.01 THOUSAND/UL (ref 0–0.2)
IMM GRANULOCYTES NFR BLD AUTO: 0 % (ref 0–2)
LYMPHOCYTES # BLD AUTO: 1.59 THOUSANDS/ΜL (ref 0.6–4.47)
LYMPHOCYTES NFR BLD AUTO: 32 % (ref 14–44)
MCH RBC QN AUTO: 32.3 PG (ref 26.8–34.3)
MCHC RBC AUTO-ENTMCNC: 33.2 G/DL (ref 31.4–37.4)
MCV RBC AUTO: 98 FL (ref 82–98)
MONOCYTES # BLD AUTO: 0.35 THOUSAND/ΜL (ref 0.17–1.22)
MONOCYTES NFR BLD AUTO: 7 % (ref 4–12)
NEUTROPHILS # BLD AUTO: 3 THOUSANDS/ΜL (ref 1.85–7.62)
NEUTS SEG NFR BLD AUTO: 59 % (ref 43–75)
NRBC BLD AUTO-RTO: 0 /100 WBCS
PLATELET # BLD AUTO: 231 THOUSANDS/UL (ref 149–390)
PMV BLD AUTO: 10.4 FL (ref 8.9–12.7)
POTASSIUM SERPL-SCNC: 4.5 MMOL/L (ref 3.5–5.3)
PROT SERPL-MCNC: 7.3 G/DL (ref 6.4–8.4)
RBC # BLD AUTO: 4.33 MILLION/UL (ref 3.81–5.12)
SODIUM SERPL-SCNC: 142 MMOL/L (ref 135–147)
WBC # BLD AUTO: 5.05 THOUSAND/UL (ref 4.31–10.16)

## 2025-02-05 PROCEDURE — 86803 HEPATITIS C AB TEST: CPT | Performed by: INTERNAL MEDICINE

## 2025-02-05 PROCEDURE — 85652 RBC SED RATE AUTOMATED: CPT | Performed by: INTERNAL MEDICINE

## 2025-02-05 PROCEDURE — G2211 COMPLEX E/M VISIT ADD ON: HCPCS | Performed by: INTERNAL MEDICINE

## 2025-02-05 PROCEDURE — 36415 COLL VENOUS BLD VENIPUNCTURE: CPT | Performed by: INTERNAL MEDICINE

## 2025-02-05 PROCEDURE — 80053 COMPREHEN METABOLIC PANEL: CPT | Performed by: INTERNAL MEDICINE

## 2025-02-05 PROCEDURE — 82306 VITAMIN D 25 HYDROXY: CPT | Performed by: INTERNAL MEDICINE

## 2025-02-05 PROCEDURE — 86140 C-REACTIVE PROTEIN: CPT | Performed by: INTERNAL MEDICINE

## 2025-02-05 PROCEDURE — 86705 HEP B CORE ANTIBODY IGM: CPT | Performed by: INTERNAL MEDICINE

## 2025-02-05 PROCEDURE — 87340 HEPATITIS B SURFACE AG IA: CPT | Performed by: INTERNAL MEDICINE

## 2025-02-05 PROCEDURE — 86704 HEP B CORE ANTIBODY TOTAL: CPT | Performed by: INTERNAL MEDICINE

## 2025-02-05 PROCEDURE — 99204 OFFICE O/P NEW MOD 45 MIN: CPT | Performed by: INTERNAL MEDICINE

## 2025-02-05 PROCEDURE — 85025 COMPLETE CBC W/AUTO DIFF WBC: CPT | Performed by: INTERNAL MEDICINE

## 2025-02-05 PROCEDURE — 86480 TB TEST CELL IMMUN MEASURE: CPT | Performed by: INTERNAL MEDICINE

## 2025-02-05 RX ORDER — FOLIC ACID 1 MG/1
1 TABLET ORAL DAILY
Qty: 90 TABLET | Refills: 1 | Status: SHIPPED | OUTPATIENT
Start: 2025-02-05

## 2025-02-05 RX ORDER — MEDROXYPROGESTERONE ACETATE 150 MG/ML
50 INJECTION, SUSPENSION INTRAMUSCULAR
Qty: 6 ML | Refills: 3 | Status: SHIPPED | OUTPATIENT
Start: 2025-02-05

## 2025-02-05 RX ORDER — METHOTREXATE 2.5 MG/1
10 TABLET ORAL WEEKLY
Qty: 60 TABLET | Refills: 2 | Status: SHIPPED | OUTPATIENT
Start: 2025-02-05

## 2025-02-05 NOTE — PROGRESS NOTES
Name: Mary Lopez      : 1960      MRN: [id number]  Encounter Provider: Arnaldo Joyner MD  Encounter Date: 2025   Encounter department: [facility] RHEUMATOLOGY [facility]  :  Assessment & Plan  Rheumatoid arthritis involving multiple sites with positive rheumatoid factor (HCC)  RA well controlled on current regimen; transfer of care from Dr. Whittington. Doing well on methotexate and Enbrel. Patient's rheumatologic disease(s) threaten long-term function if not appropriately managed.    Orders:    CBC and differential; Standing    Comprehensive metabolic panel; Standing    C-reactive protein; Standing    Sedimentation rate, automated; Standing    folic acid (FOLVITE) 1 mg tablet; Take 1 tablet (1 mg total) by mouth daily    methotrexate 2.5 MG tablet; Take 4 tablets (10 mg total) by mouth once a week    etanercept (Enbrel SureClick) 50 MG/ML injection; Inject 1 mL (50 mg total) under the skin every 14 (fourteen) days      Continue methotrexate 4 tabs once a week  Continue folic acid daily  Continue Enbrel every other week injections  Do labs now, then every 3 months  Age-related osteoporosis with current pathological fracture with delayed healing, subsequent encounter  Had side effects to Tymlos; needs anabolic agent with lowest t-score of -3.7 at left femoral neck. Had fragility fracture of left humerus in 2024.  Orders:    Vitamin D 25 hydroxy  Continue 2,000 units daily Vit. D  Take 1,200mg daily calcium  Continue weight-bearing exercise  Start Evenity monthly injections; will work on prior auth  High risk medication use  Benefits and risks of methotrexate use, including but not limited to gastrointestinal disturbances such as diarrhea, hair loss, fatigue, stomatitis, leukopenia, lung hypersensitivity reaction, hepatotoxicity, and lymphoma were discussed with the patient. Baseline viral hepatitis panel was ordered.  CBC,CMP will be regularly monitored.  Patient was prescribed Folic  Acid 1 mg po daily to take while on methotrexate to help prevent side effects. Patient counseled on abstinence from alcohol while using methotrexate.     Benefits and risks of TNF inhibitors such as Humira discussed, and include but are not limited to leukopenia, reactivation of hepatitis B/C or TB, lymphoma, infusion or site reactions, exacerbation of heart failure, and increased risk of infections. A baseline CBC, CMP, Hepatitis B/C status, and TB test were checked. Patient will need regular CBC, CMP monitoring.    Reviewed with patient the black emily warning of Evenity of possible increased cardiovascular risk of MI or stroke, especially in patients with history of cardiovascular disease. The medication also has a risk of osteonecrosis of the jaw in patients with exposed jaw bone. Patient does not plan to get any invasive dental procedures in the near future.    Orders:    CBC and differential; Standing    Comprehensive metabolic panel; Standing    Quantiferon TB Gold Plus Assay    Chronic Hepatitis Panel  Do labs now, then every 3 months    Continue methotrexate 4 tabs once a week  Continue folic acid daily  Start Evenity monthly injections; will work on prior auth  Continue Enbrel every other week injections  Do labs now, then every 3 months  Continue 2,000 units daily Vit. D  Take 1,200mg daily calcium  Continue weight-bearing exercise    Return to clinic in 6 months for Evenity injection plus provider visit        History of Present Illness   HPI  Mary Lopez is a 64 y.o. female who presents for transfer of care from [facility], she was previously seen by Dr. Whittington in Sept 2024 for RA and osteoporosis; [facility] .  She was originally diagnosed with RA when she was 20 and osteoporosis last year.  She is currently on Enbrel every other week and MTX 4-6 pills once a week for the RA.  She was on Tymlos for osteoporosis, experienced dizziness and palpitations, and switched to Fosamax with no  "SE. She has been adherent to her current regimen.  Today she feels like her symptoms are well controlled.   History obtained from: patient    Review of Systems   Constitutional:  Negative for chills and fever.   HENT:  Negative for ear pain.    Eyes:  Negative for pain and visual disturbance.   Respiratory:  Negative for cough and shortness of breath.    Cardiovascular:  Negative for chest pain and palpitations.   Gastrointestinal:  Negative for abdominal pain and vomiting.   Genitourinary:  Negative for dysuria and hematuria.   Musculoskeletal:  Negative for arthralgias and back pain.   Skin:  Negative for color change and rash.   Neurological:  Negative for seizures and syncope.   All other systems reviewed and are negative.         Objective   /72 (BP Location: Right arm, Patient Position: Sitting, Cuff Size: Adult)   Pulse 69   Temp 97.9 °F (36.6 °C) (Tympanic)   Ht 5' 3\" (1.6 m)   Wt 69.8 kg (153 lb 12.8 oz)   SpO2 98%   BMI 27.24 kg/m²      Physical Exam  Vitals and nursing note reviewed.   Constitutional:       General: She is not in acute distress.     Appearance: She is well-developed.   HENT:      Head: Normocephalic and atraumatic.   Eyes:      Conjunctiva/sclera: Conjunctivae normal.   Cardiovascular:      Rate and Rhythm: Normal rate and regular rhythm.      Heart sounds: No murmur heard.  Pulmonary:      Effort: Pulmonary effort is normal. No respiratory distress.      Breath sounds: Normal breath sounds.   Abdominal:      Palpations: Abdomen is soft.      Tenderness: There is no abdominal tenderness.   Musculoskeletal:         General: No swelling.   Skin:     General: Skin is warm and dry.      Capillary Refill: Capillary refill takes less than 2 seconds.   Neurological:      Mental Status: She is alert.   Psychiatric:         Mood and Affect: Mood normal.       DEXA Scan at [facility] 6/7/24  Average bone density from L1 to L4 measured 0.818 g/ cm2.   This corresponds to 78 percent of peak " bone mass. Findings are 2.1 standard   deviations below the mean for peak bone mass.     Bone densitometry of the left hip was performed. Total bone density of the left   hip measured 0.598 g/ cm2. This corresponds to 64 percent of peak bone  mass.   Findings are 2.8 standard deviations below the mean for peak bone mass.     In the left femoral neck, bone density measures 0.434  g/cm2, corresponding to  51    percent of peak bone mass and 3.7 standard deviations below the mean for peak   bone mass.

## 2025-02-05 NOTE — ASSESSMENT & PLAN NOTE
Had side effects to Tymlos; needs anabolic agent with lowest t-score of -3.7 at left femoral neck. Had fragility fracture of left humerus in 11/2024.  Orders:    Vitamin D 25 hydroxy  Continue 2,000 units daily Vit. D  Take 1,200mg daily calcium  Continue weight-bearing exercise  Start Evenity monthly injections; will work on prior auth

## 2025-02-05 NOTE — PATIENT INSTRUCTIONS
Continue methotrexate 4 tabs once a week  Continue folic acid daily  Start Evenity monthly injections; will work on prior auth  Continue Enbrel every other week injections  Do labs now, then every 3 months  Continue 2,000 units daily Vit. D  Take 1,200mg daily calcium  Continue weight-bearing exercise    Return to clinic in 6 months for Evenity injection plus provider visit

## 2025-02-05 NOTE — ASSESSMENT & PLAN NOTE
RA well controlled on current regimen; transfer of care from Dr. Whittington. Doing well on methotexate and Enbrel. Patient's rheumatologic disease(s) threaten long-term function if not appropriately managed.    Orders:    CBC and differential; Standing    Comprehensive metabolic panel; Standing    C-reactive protein; Standing    Sedimentation rate, automated; Standing    folic acid (FOLVITE) 1 mg tablet; Take 1 tablet (1 mg total) by mouth daily    methotrexate 2.5 MG tablet; Take 4 tablets (10 mg total) by mouth once a week    etanercept (Enbrel SureClick) 50 MG/ML injection; Inject 1 mL (50 mg total) under the skin every 14 (fourteen) days      Continue methotrexate 4 tabs once a week  Continue folic acid daily  Continue Enbrel every other week injections  Do labs now, then every 3 months

## 2025-02-06 LAB
GAMMA INTERFERON BACKGROUND BLD IA-ACNC: 0.02 IU/ML
HBV CORE AB SER QL: NORMAL
HBV CORE IGM SER QL: NORMAL
HBV SURFACE AG SER QL: NORMAL
HCV AB SER QL: NORMAL
M TB IFN-G BLD-IMP: NEGATIVE
M TB IFN-G CD4+ BCKGRND COR BLD-ACNC: 0 IU/ML
M TB IFN-G CD4+ BCKGRND COR BLD-ACNC: 0.02 IU/ML
MITOGEN IGNF BCKGRD COR BLD-ACNC: 4.82 IU/ML

## 2025-02-10 ENCOUNTER — TELEPHONE (OUTPATIENT)
Age: 65
End: 2025-02-10

## 2025-02-10 NOTE — TELEPHONE ENCOUNTER
Rheumatology Prior Authorization Request      Medication/Disease Information:   Diagnosis:  severe osteoporosis, lowest t-score of -3.7 at left femoral neck  Medication:  Evenity (romosozumab) 210 mg subcutaneous injections monthly for 12 months at [facility]  Failed or Intolerant to or Contraindicated:  Tymlos - dizziness, palpitations; alendronate - ineffective; Prolia not strong enough for level of osteoporosis  Screening  Renal function:  normal  Taking daily calcium and Vit. D supplementation

## 2025-02-10 NOTE — TELEPHONE ENCOUNTER
Rheumatology Prior Authorization Request      Medication/Disease Information:   Diagnosis: seropositive rheumatoid arthritis  Medication:  Enbrel (etanercept) 50 mg weekly subcutaneous pen injection  Failed or Intolerant to or Contraindicated:  continuation of therapy with good control of disease, patient is transfer of care; currently also on methotrexate  Screening  Hepatitis B/C:  negative  Tuberculosis: negative  No active infections  Up to Date on immunizations     Sent prescription to [facility]

## 2025-02-11 NOTE — TELEPHONE ENCOUNTER
PA for Enbrel sureclick SUBMITTED to [employer]    via    [x]CMM-KEY: [id number]  []Surescripts-Case ID #    []Availity-Auth ID #  NDC #    []Faxed to plan   []Other website    []Phone call Case ID #      [x]PA sent as URGENT    All office notes, labs and other pertaining documents and studies sent. Clinical questions answered. Awaiting determination from insurance company.     Turnaround time for your insurance to make a decision on your Prior Authorization can take 7-21 business days.

## 2025-02-16 DIAGNOSIS — M80.00XG AGE-RELATED OSTEOPOROSIS WITH CURRENT PATHOLOGICAL FRACTURE WITH DELAYED HEALING, SUBSEQUENT ENCOUNTER: Primary | ICD-10-CM

## 2025-02-20 ENCOUNTER — TELEPHONE (OUTPATIENT)
Dept: RHEUMATOLOGY | Facility: CLINIC | Age: 65
End: 2025-02-20

## 2025-02-20 NOTE — TELEPHONE ENCOUNTER
Patient returned call to schedule evenity injection. Patient requested a call back to schedule. Please advise.

## 2025-02-20 NOTE — TELEPHONE ENCOUNTER
Medication arrived in the office on 2/20/25, called patient and left message adving them that we can schedule a nurse visit for whenever works best, advised to call back to schedule

## 2025-02-21 ENCOUNTER — CLINICAL SUPPORT (OUTPATIENT)
Dept: RHEUMATOLOGY | Facility: CLINIC | Age: 65
End: 2025-02-21
Payer: COMMERCIAL

## 2025-02-21 VITALS
BODY MASS INDEX: 27.64 KG/M2 | HEIGHT: 63 IN | SYSTOLIC BLOOD PRESSURE: 124 MMHG | OXYGEN SATURATION: 96 % | HEART RATE: 78 BPM | DIASTOLIC BLOOD PRESSURE: 76 MMHG | WEIGHT: 156 LBS

## 2025-02-21 DIAGNOSIS — M80.00XG AGE-RELATED OSTEOPOROSIS WITH CURRENT PATHOLOGICAL FRACTURE WITH DELAYED HEALING, SUBSEQUENT ENCOUNTER: Primary | ICD-10-CM

## 2025-02-21 PROCEDURE — 96372 THER/PROPH/DIAG INJ SC/IM: CPT

## 2025-02-21 NOTE — PROGRESS NOTES
Assessment/Plan:    Mary Lopez came into the [facility] Rheumatology Office today 02/21/25 to receive Evenity #1 injection.      Verbal consent obtained.  Consent given by: patient    patient states patient has been medically healthy with no underlining concerns/complications.      Mary Lopez presents with no symptoms today.       All insturctions were reviewed with the patient.    If the patient should have any questions/concerns, advised patient to contacted [facility] Rheumatology Office.       Subjective:     History provided by: patient    Patient ID: Mary Lopez is a 64 y.o. female      Objective:    There were no vitals filed for this visit.    Patient tolerated the injection well without any complications.  Injection site/s left and right arm.  Medication was provided by specialty pharmacy.    Patient signed consent form yes   Patient signed ABN form no (If no patient is not a medicare patient).   Patient waited 15 minutes after injection yes (This only applies to patient's receiving first time injection).       Last Visit: 2/20/2025  Next visit:Visit date not found

## 2025-03-20 DIAGNOSIS — M05.79 RHEUMATOID ARTHRITIS INVOLVING MULTIPLE SITES WITH POSITIVE RHEUMATOID FACTOR (HCC): ICD-10-CM

## 2025-03-24 ENCOUNTER — CLINICAL SUPPORT (OUTPATIENT)
Dept: RHEUMATOLOGY | Facility: CLINIC | Age: 65
End: 2025-03-24
Payer: COMMERCIAL

## 2025-03-24 DIAGNOSIS — M80.00XG AGE-RELATED OSTEOPOROSIS WITH CURRENT PATHOLOGICAL FRACTURE WITH DELAYED HEALING, SUBSEQUENT ENCOUNTER: Primary | ICD-10-CM

## 2025-03-24 PROCEDURE — 96372 THER/PROPH/DIAG INJ SC/IM: CPT

## 2025-03-24 RX ORDER — MEDROXYPROGESTERONE ACETATE 150 MG/ML
50 INJECTION, SUSPENSION INTRAMUSCULAR
Qty: 12 ML | Refills: 3 | Status: SHIPPED | OUTPATIENT
Start: 2025-03-24

## 2025-03-24 NOTE — PROGRESS NOTES
Assessment/Plan:    Mary Lopez came into the [facility] Rheumatology Office today 03/24/25 to receive Evenity  injection.      Verbal consent obtained.  Consent given by: patient    patient states patient has been medically healthy with no underlining concerns/complications.      Mary Lopez presents with no symptoms today.       All insturctions were reviewed with the patient.    If the patient should have any questions/concerns, advised patient to contacted [facility] Rheumatology Office.       Subjective:     History provided by: patient    Patient ID: Mary Lopez is a 64 y.o. female      Objective:    There were no vitals filed for this visit.    Patient tolerated the injection well without any complications.  Injection site/s left and right arm.  Medication was provided by specialty pharmacy.  Specialty Pharmacy Name [facility]     Patient signed consent form no   Patient signed ABN form no (If no patient is not a medicare patient).   Patient waited 15 minutes after injection no (This only applies to patient's receiving first time injection).       Last Visit: 3/20/2025  Next visit:Visit date not found

## 2025-04-25 ENCOUNTER — CLINICAL SUPPORT (OUTPATIENT)
Dept: RHEUMATOLOGY | Facility: CLINIC | Age: 65
End: 2025-04-25
Payer: COMMERCIAL

## 2025-04-25 ENCOUNTER — APPOINTMENT (OUTPATIENT)
Dept: LAB | Facility: CLINIC | Age: 65
End: 2025-04-25
Payer: COMMERCIAL

## 2025-04-25 DIAGNOSIS — M81.0 AGE-RELATED OSTEOPOROSIS WITHOUT CURRENT PATHOLOGICAL FRACTURE: Primary | ICD-10-CM

## 2025-04-25 DIAGNOSIS — M05.79 SEROPOSITIVE RHEUMATOID ARTHRITIS OF MULTIPLE SITES (HCC): Primary | ICD-10-CM

## 2025-04-25 DIAGNOSIS — Z79.899 POLYPHARMACY: ICD-10-CM

## 2025-04-25 LAB
ALBUMIN SERPL BCG-MCNC: 4.1 G/DL (ref 3.5–5)
ALP SERPL-CCNC: 96 U/L (ref 34–104)
ALT SERPL W P-5'-P-CCNC: 12 U/L (ref 7–52)
ANION GAP SERPL CALCULATED.3IONS-SCNC: 6 MMOL/L (ref 4–13)
AST SERPL W P-5'-P-CCNC: 17 U/L (ref 13–39)
BASOPHILS # BLD AUTO: 0.03 THOUSANDS/ÂΜL (ref 0–0.1)
BASOPHILS NFR BLD AUTO: 1 % (ref 0–1)
BILIRUB SERPL-MCNC: 0.38 MG/DL (ref 0.2–1)
BUN SERPL-MCNC: 16 MG/DL (ref 5–25)
CALCIUM SERPL-MCNC: 9.2 MG/DL (ref 8.4–10.2)
CHLORIDE SERPL-SCNC: 105 MMOL/L (ref 96–108)
CO2 SERPL-SCNC: 29 MMOL/L (ref 21–32)
CREAT SERPL-MCNC: 0.77 MG/DL (ref 0.6–1.3)
CRP SERPL QL: 1.6 MG/L
EOSINOPHIL # BLD AUTO: 0.11 THOUSAND/ÂΜL (ref 0–0.61)
EOSINOPHIL NFR BLD AUTO: 2 % (ref 0–6)
ERYTHROCYTE [DISTWIDTH] IN BLOOD BY AUTOMATED COUNT: 13.8 % (ref 11.6–15.1)
ERYTHROCYTE [SEDIMENTATION RATE] IN BLOOD: 18 MM/HOUR (ref 0–29)
GFR SERPL CREATININE-BSD FRML MDRD: 83 ML/MIN/1.73SQ M
GLUCOSE SERPL-MCNC: 96 MG/DL (ref 65–140)
HCT VFR BLD AUTO: 41.2 % (ref 34.8–46.1)
HGB BLD-MCNC: 13.5 G/DL (ref 11.5–15.4)
IMM GRANULOCYTES # BLD AUTO: 0.02 THOUSAND/UL (ref 0–0.2)
IMM GRANULOCYTES NFR BLD AUTO: 0 % (ref 0–2)
LYMPHOCYTES # BLD AUTO: 1.79 THOUSANDS/ÂΜL (ref 0.6–4.47)
LYMPHOCYTES NFR BLD AUTO: 27 % (ref 14–44)
MCH RBC QN AUTO: 32 PG (ref 26.8–34.3)
MCHC RBC AUTO-ENTMCNC: 32.8 G/DL (ref 31.4–37.4)
MCV RBC AUTO: 98 FL (ref 82–98)
MONOCYTES # BLD AUTO: 0.51 THOUSAND/ÂΜL (ref 0.17–1.22)
MONOCYTES NFR BLD AUTO: 8 % (ref 4–12)
NEUTROPHILS # BLD AUTO: 4.09 THOUSANDS/ÂΜL (ref 1.85–7.62)
NEUTS SEG NFR BLD AUTO: 62 % (ref 43–75)
NRBC BLD AUTO-RTO: 0 /100 WBCS
PLATELET # BLD AUTO: 254 THOUSANDS/UL (ref 149–390)
PMV BLD AUTO: 10.4 FL (ref 8.9–12.7)
POTASSIUM SERPL-SCNC: 4.6 MMOL/L (ref 3.5–5.3)
PROT SERPL-MCNC: 7 G/DL (ref 6.4–8.4)
RBC # BLD AUTO: 4.22 MILLION/UL (ref 3.81–5.12)
SODIUM SERPL-SCNC: 140 MMOL/L (ref 135–147)
WBC # BLD AUTO: 6.55 THOUSAND/UL (ref 4.31–10.16)

## 2025-04-25 PROCEDURE — 36415 COLL VENOUS BLD VENIPUNCTURE: CPT

## 2025-04-25 PROCEDURE — 86140 C-REACTIVE PROTEIN: CPT

## 2025-04-25 PROCEDURE — 85652 RBC SED RATE AUTOMATED: CPT

## 2025-04-25 PROCEDURE — 85025 COMPLETE CBC W/AUTO DIFF WBC: CPT

## 2025-04-25 PROCEDURE — 80053 COMPREHEN METABOLIC PANEL: CPT

## 2025-04-25 PROCEDURE — 96372 THER/PROPH/DIAG INJ SC/IM: CPT

## 2025-04-25 NOTE — PROGRESS NOTES
Assessment/Plan:    Mary Lopez came into the [facility] Rheumatology Office today 04/25/25 to receive Evenity#3 injection.      Verbal consent obtained.  Consent given by: patient    patient states patient has been medically healthy with no underlining concerns/complications.      Mary Lopez presents with no symptoms today.       All insturctions were reviewed with the patient.    If the patient should have any questions/concerns, advised patient to contacted [facility] Rheumatology Office.       Subjective:     History provided by: patient    Patient ID: Mary Lopez is a 64 y.o. female      Objective:    There were no vitals filed for this visit.    Patient tolerated the injection well without any complications.  Injection site/s bl arms.  Medication was provided by specialty pharmacy.  Specialty Pharmacy Name [facility]     Patient signed consent form yes   Patient signed ABN form yes (If no patient is not a medicare patient).   Patient waited 15 minutes after injection no (This only applies to patient's receiving first time injection).       Last Visit: 4/25/2025  Next visit:Visit date not found

## 2025-05-12 ENCOUNTER — TELEPHONE (OUTPATIENT)
Age: 65
End: 2025-05-12

## 2025-05-12 NOTE — TELEPHONE ENCOUNTER
Patient calling stating she is on eventity and her filing in her mouth cracked. Asked if ok to have dental completed, please advise.

## 2025-05-13 NOTE — TELEPHONE ENCOUNTER
Yes, she should get the dental work completed ASAP so that it is fully healed before her next scheduled Evenity injection; if it is not healed by then, then she should postpone the Evenity injection nurse visit to until after it is healed.

## 2025-05-14 NOTE — TELEPHONE ENCOUNTER
Patient calling for update if she needs to be on abx before dental work, she is worried, please assist.

## 2025-05-14 NOTE — TELEPHONE ENCOUNTER
Patient asking if she needs to be put on antibiotics, she is scheduled for route canal tomorrow morning. Also she would like to reschedule her injection until June, please advise.

## 2025-05-15 NOTE — TELEPHONE ENCOUNTER
Please reschedule her Evenity injection to next month, and let her know that I don't believe she needs antibiotics

## 2025-05-16 NOTE — TELEPHONE ENCOUNTER
Spoke tot patient who did have route canal  and was agreeable to moving Vvenity to next months pt expressed understanding and did confirm medication is in office

## 2025-06-16 ENCOUNTER — CLINICAL SUPPORT (OUTPATIENT)
Dept: RHEUMATOLOGY | Facility: CLINIC | Age: 65
End: 2025-06-16
Payer: COMMERCIAL

## 2025-06-16 DIAGNOSIS — M81.0 AGE-RELATED OSTEOPOROSIS WITHOUT CURRENT PATHOLOGICAL FRACTURE: Primary | ICD-10-CM

## 2025-06-16 PROCEDURE — 96372 THER/PROPH/DIAG INJ SC/IM: CPT

## 2025-06-16 NOTE — PROGRESS NOTES
Assessment/Plan:    Mary Lopez came into the [facility] Rheumatology Office today 06/16/25 to receive Evenity# 4 injection.      Verbal consent obtained.  Consent given by: patient    patient states patient has been medically healthy with no underlining concerns/complications.      Mary Lopez presents with no symptoms today.       All insturctions were reviewed with the patient.    If the patient should have any questions/concerns, advised patient to contacted [facility] Rheumatology Office.       Subjective:     History provided by: patient    Patient ID: Mary Lopez is a 64 y.o. female      Objective:    There were no vitals filed for this visit.    Patient tolerated the injection well without any complications.  Injection site/s left and right arms.  Number of Dose 4  Medication was provided by Patient.  Specialty Pharmacy: [facility]      Patient signed consent form no  Patient signed ABN form no (If no patient is not a medicare patient).   Patient waited 15 minutes after injection no (This only applies to patient's receiving first time injection).       Last Visit: 4/25/2025  Next visit:10/28/2025

## 2025-07-01 ENCOUNTER — TELEPHONE (OUTPATIENT)
Dept: RHEUMATOLOGY | Facility: CLINIC | Age: 65
End: 2025-07-01

## 2025-07-01 NOTE — TELEPHONE ENCOUNTER
Spoke to pt to reschedule 7/18 nurse visit due to no provider in office. Rescheduled for 7/21.Pt was understanding.

## 2025-07-22 ENCOUNTER — APPOINTMENT (OUTPATIENT)
Dept: LAB | Facility: CLINIC | Age: 65
End: 2025-07-22
Payer: COMMERCIAL

## 2025-07-22 ENCOUNTER — TELEPHONE (OUTPATIENT)
Dept: RHEUMATOLOGY | Facility: CLINIC | Age: 65
End: 2025-07-22

## 2025-07-22 DIAGNOSIS — Z79.899 POLYPHARMACY: ICD-10-CM

## 2025-07-22 DIAGNOSIS — M05.79 SEROPOSITIVE RHEUMATOID ARTHRITIS OF MULTIPLE SITES (HCC): Primary | ICD-10-CM

## 2025-07-22 LAB
ALBUMIN SERPL BCG-MCNC: 3.9 G/DL (ref 3.5–5)
ALP SERPL-CCNC: 89 U/L (ref 34–104)
ALT SERPL W P-5'-P-CCNC: 13 U/L (ref 7–52)
ANION GAP SERPL CALCULATED.3IONS-SCNC: 10 MMOL/L (ref 4–13)
AST SERPL W P-5'-P-CCNC: 21 U/L (ref 13–39)
BASOPHILS # BLD AUTO: 0.03 THOUSANDS/ÂΜL (ref 0–0.1)
BASOPHILS NFR BLD AUTO: 1 % (ref 0–1)
BILIRUB SERPL-MCNC: 0.35 MG/DL (ref 0.2–1)
BUN SERPL-MCNC: 17 MG/DL (ref 5–25)
CALCIUM SERPL-MCNC: 9 MG/DL (ref 8.4–10.2)
CHLORIDE SERPL-SCNC: 103 MMOL/L (ref 96–108)
CO2 SERPL-SCNC: 26 MMOL/L (ref 21–32)
CREAT SERPL-MCNC: 0.82 MG/DL (ref 0.6–1.3)
CRP SERPL QL: 2.1 MG/L
EOSINOPHIL # BLD AUTO: 0.11 THOUSAND/ÂΜL (ref 0–0.61)
EOSINOPHIL NFR BLD AUTO: 2 % (ref 0–6)
ERYTHROCYTE [DISTWIDTH] IN BLOOD BY AUTOMATED COUNT: 13.7 % (ref 11.6–15.1)
ERYTHROCYTE [SEDIMENTATION RATE] IN BLOOD: 37 MM/HOUR (ref 0–29)
GFR SERPL CREATININE-BSD FRML MDRD: 76 ML/MIN/1.73SQ M
GLUCOSE P FAST SERPL-MCNC: 100 MG/DL (ref 65–99)
HCT VFR BLD AUTO: 40.4 % (ref 34.8–46.1)
HGB BLD-MCNC: 13.3 G/DL (ref 11.5–15.4)
IMM GRANULOCYTES # BLD AUTO: 0.02 THOUSAND/UL (ref 0–0.2)
IMM GRANULOCYTES NFR BLD AUTO: 0 % (ref 0–2)
LYMPHOCYTES # BLD AUTO: 1.93 THOUSANDS/ÂΜL (ref 0.6–4.47)
LYMPHOCYTES NFR BLD AUTO: 34 % (ref 14–44)
MCH RBC QN AUTO: 32 PG (ref 26.8–34.3)
MCHC RBC AUTO-ENTMCNC: 32.9 G/DL (ref 31.4–37.4)
MCV RBC AUTO: 97 FL (ref 82–98)
MONOCYTES # BLD AUTO: 0.48 THOUSAND/ÂΜL (ref 0.17–1.22)
MONOCYTES NFR BLD AUTO: 8 % (ref 4–12)
NEUTROPHILS # BLD AUTO: 3.14 THOUSANDS/ÂΜL (ref 1.85–7.62)
NEUTS SEG NFR BLD AUTO: 55 % (ref 43–75)
NRBC BLD AUTO-RTO: 0 /100 WBCS
PLATELET # BLD AUTO: 255 THOUSANDS/UL (ref 149–390)
PMV BLD AUTO: 10.3 FL (ref 8.9–12.7)
POTASSIUM SERPL-SCNC: 4.4 MMOL/L (ref 3.5–5.3)
PROT SERPL-MCNC: 6.9 G/DL (ref 6.4–8.4)
RBC # BLD AUTO: 4.15 MILLION/UL (ref 3.81–5.12)
SODIUM SERPL-SCNC: 139 MMOL/L (ref 135–147)
WBC # BLD AUTO: 5.71 THOUSAND/UL (ref 4.31–10.16)

## 2025-07-22 PROCEDURE — 80053 COMPREHEN METABOLIC PANEL: CPT

## 2025-07-22 PROCEDURE — 85652 RBC SED RATE AUTOMATED: CPT

## 2025-07-22 PROCEDURE — 85025 COMPLETE CBC W/AUTO DIFF WBC: CPT

## 2025-07-22 PROCEDURE — 36415 COLL VENOUS BLD VENIPUNCTURE: CPT

## 2025-07-22 PROCEDURE — 86140 C-REACTIVE PROTEIN: CPT

## 2025-07-22 NOTE — TELEPHONE ENCOUNTER
Spoke to Mary at [facility] to arrange delivery of pts medication. She said there may be and issues with pts copay card, and they would reach out to her today, advised pt has appt on the 31st of July

## 2025-07-22 NOTE — TELEPHONE ENCOUNTER
Patient is calling stating that the medical card is not covering it because we are billing this as prescription and not medical since the patient gets the shot in office it needs to be billed under medical please call patient thank you

## 2025-07-23 NOTE — TELEPHONE ENCOUNTER
Patient calling back to check on the status of the billing for Evenity.    Patient also provided pharmacy phone number  [phone]    Patient requesting a call back

## 2025-07-23 NOTE — TELEPHONE ENCOUNTER
Spoke to pt who confirmed she has a copay card and that she does not know why it is causing an issue, advised I would reach ou tot our rep tomarrow and speak to her once I figure out what's going on, also advised pt that if she has her card to send us a picture of it through [employer]. Pt express understanding and agreement with this plan

## 2025-07-23 NOTE — TELEPHONE ENCOUNTER
Called pharmacy again and they stated their are issues with pts copay card, was adivsed to relay to pt that they need to call both insurances they have that figured out then call the pharmacy to to relay needed information to them

## 2025-07-24 NOTE — TELEPHONE ENCOUNTER
Pt called back after speaking to [employer] as advised was told that her insurance is putting a cap on the total that can be given which is 50 dollars, she will not be able to afford the medication other wise, advised that I am unsure who to help and have enlisted help from practice coordinator for further assistance

## 2025-07-24 NOTE — TELEPHONE ENCOUNTER
Spoke to pt and advised she call [employer] copay program for assistance the copay card number was given to pt who expressed understanding. Pt stated they would call back once they speak to [employer]

## 2025-07-28 NOTE — TELEPHONE ENCOUNTER
"Called and spoke with Nelly at [facility] [phone].  She checked with her .  She is saying that Mary has \"third party insurance\" - a Medicaid plan with a high copay.  It needs to be \"cleared\" in order to use the copay card.  Nelly said that the  left Mary a voicemail message on Friday asking that she call her back.     I called and spoke with Mary.  I explained what what told to me.  Mary says she has no additional plans.  I asked that she call the  back at [facility] to see if she can get things straightened out.      Mary will call me back and let me know what happens.   "

## 2025-07-28 NOTE — TELEPHONE ENCOUNTER
Patient called in wanting to speak with Ángela Jean regarding her Evenity medication. Patient requested a called back for futher assist. Please advise.

## 2025-07-29 NOTE — TELEPHONE ENCOUNTER
Spoke with the patient.  She says she spoke with the pharmacy.  They did have another insurance listed for her.   They removed that information.  Everything is now clear to go and she is able to use the [employer] copay card.  The pharmacy told her that the medication should be delivered in time for her office visit on 7/31.  I recommended to the patient that she call the office before she comes over to make sure the medicine has arrived.

## 2025-07-31 ENCOUNTER — CLINICAL SUPPORT (OUTPATIENT)
Dept: RHEUMATOLOGY | Facility: CLINIC | Age: 65
End: 2025-07-31
Payer: COMMERCIAL

## 2025-07-31 VITALS
DIASTOLIC BLOOD PRESSURE: 90 MMHG | HEIGHT: 63 IN | BODY MASS INDEX: 27.63 KG/M2 | SYSTOLIC BLOOD PRESSURE: 124 MMHG | OXYGEN SATURATION: 97 % | HEART RATE: 79 BPM

## 2025-07-31 DIAGNOSIS — M81.0 AGE-RELATED OSTEOPOROSIS WITHOUT CURRENT PATHOLOGICAL FRACTURE: Primary | ICD-10-CM

## 2025-07-31 PROCEDURE — 96372 THER/PROPH/DIAG INJ SC/IM: CPT

## 2025-08-18 ENCOUNTER — TELEPHONE (OUTPATIENT)
Age: 65
End: 2025-08-18

## (undated) DEVICE — SCD SEQUENTIAL COMPRESSION COMFORT SLEEVE MEDIUM KNEE LENGTH: Brand: KENDALL SCD

## (undated) DEVICE — GLOVE INDICATOR PI UNDERGLOVE SZ 8 BLUE

## (undated) DEVICE — SUT ETHIBOND 5 V-37 30 IN MB66G

## (undated) DEVICE — CHEST ROLL FOAM POSITIONER: Brand: CARDINAL HEALTH

## (undated) DEVICE — BLADE SAGITTAL 63.0 X 19.5MM

## (undated) DEVICE — GLOVE INDICATOR PI UNDERGLOVE SZ 7 BLUE

## (undated) DEVICE — GAUZE SPONGES,16 PLY: Brand: CURITY

## (undated) DEVICE — BETHLEHEM TOTAL HIP, KIT: Brand: CARDINAL HEALTH

## (undated) DEVICE — PREP SURGICAL PURPREP 26ML

## (undated) DEVICE — PENCIL ELECTROSURG E-Z CLEAN -0035H

## (undated) DEVICE — SUT FIBERWIRE #2 1/2 CIRCLE T-5 38IN AR-7200

## (undated) DEVICE — 3M™ IOBAN™ 2 ANTIMICROBIAL INCISE DRAPE 6648EZ: Brand: IOBAN™ 2

## (undated) DEVICE — SUT MONOCRYL 3-0 PS-2 27 IN Y427H

## (undated) DEVICE — FRAZIER SUCTION INSTRUMENT 18 FR W/OBTURATOR, NO CONTROL VENT: Brand: FRAZIER

## (undated) DEVICE — OCCLUSIVE GAUZE STRIP,3% BISMUTH TRIBROMOPHENATE IN PETROLATUM BLEND: Brand: XEROFORM

## (undated) DEVICE — 3M™ STERI-STRIP™ REINFORCED ADHESIVE SKIN CLOSURES, R1547, 1/2 IN X 4 IN (12 MM X 100 MM), 6 STRIPS/ENVELOPE: Brand: 3M™ STERI-STRIP™

## (undated) DEVICE — GLOVE INDICATOR PI UNDERGLOVE SZ 6.5 BLUE

## (undated) DEVICE — HANDPIECE SET WITH RETRACTABLE COAXIAL FAN SPRAY TIP AND SUCTION TUBE: Brand: INTERPULSE

## (undated) DEVICE — GLOVE SRG BIOGEL 8

## (undated) DEVICE — SUT VICRYL 2-0 CT-2 27 IN J269H

## (undated) DEVICE — SURGICAL GOWN, XL SMARTSLEEVE: Brand: CONVERTORS

## (undated) DEVICE — ABDOMINAL PAD: Brand: DERMACEA

## (undated) DEVICE — POSITIONER OSI BEACH CHAIR FOAM

## (undated) DEVICE — IMMOBILIZER SHOULDER QUICK FIT SLING W/PILLOW

## (undated) DEVICE — GLOVE SRG BIOGEL 6.5

## (undated) DEVICE — CAPIT SHOULDER REVERSE TOTAL

## (undated) DEVICE — 3M™ MICROFOAM™ TAPE 1528-4: Brand: 3M™ MICROFOAM™

## (undated) DEVICE — 3000CC GUARDIAN II: Brand: GUARDIAN

## (undated) DEVICE — INTENDED FOR TISSUE SEPARATION, AND OTHER PROCEDURES THAT REQUIRE A SHARP SURGICAL BLADE TO PUNCTURE OR CUT.: Brand: BARD-PARKER ® CARBON RIB-BACK BLADES

## (undated) DEVICE — COBAN 6 IN STERILE

## (undated) DEVICE — IMPERVIOUS STOCKINETTE: Brand: DEROYAL